# Patient Record
Sex: FEMALE | Race: WHITE | NOT HISPANIC OR LATINO | Employment: FULL TIME | URBAN - METROPOLITAN AREA
[De-identification: names, ages, dates, MRNs, and addresses within clinical notes are randomized per-mention and may not be internally consistent; named-entity substitution may affect disease eponyms.]

---

## 2020-09-14 ENCOUNTER — NURSE TRIAGE (OUTPATIENT)
Dept: OTHER | Facility: OTHER | Age: 60
End: 2020-09-14

## 2020-09-14 DIAGNOSIS — Z11.59 SPECIAL SCREENING EXAMINATION FOR VIRAL DISEASE: ICD-10-CM

## 2020-09-14 DIAGNOSIS — Z11.59 SPECIAL SCREENING EXAMINATION FOR VIRAL DISEASE: Primary | ICD-10-CM

## 2020-09-14 PROCEDURE — U0003 INFECTIOUS AGENT DETECTION BY NUCLEIC ACID (DNA OR RNA); SEVERE ACUTE RESPIRATORY SYNDROME CORONAVIRUS 2 (SARS-COV-2) (CORONAVIRUS DISEASE [COVID-19]), AMPLIFIED PROBE TECHNIQUE, MAKING USE OF HIGH THROUGHPUT TECHNOLOGIES AS DESCRIBED BY CMS-2020-01-R: HCPCS | Performed by: FAMILY MEDICINE

## 2020-09-14 NOTE — TELEPHONE ENCOUNTER
Regarding: ETINP-12 test required by [facility]  ----- Message from George Augustin RN sent at 9/14/2020  9:41 AM EDT -----  Patient is a [employer] and requires [phone] testing

## 2020-09-15 LAB — SARS-COV-2 RNA SPEC QL NAA+PROBE: NOT DETECTED

## 2021-01-05 ENCOUNTER — IMMUNIZATIONS (OUTPATIENT)
Dept: FAMILY MEDICINE CLINIC | Facility: HOSPITAL | Age: 61
End: 2021-01-05

## 2021-01-05 DIAGNOSIS — Z23 ENCOUNTER FOR IMMUNIZATION: ICD-10-CM

## 2021-01-05 PROCEDURE — 0011A SARS-COV-2 / COVID-19 MRNA VACCINE (MODERNA) 100 MCG: CPT

## 2021-01-05 PROCEDURE — 91301 SARS-COV-2 / COVID-19 MRNA VACCINE (MODERNA) 100 MCG: CPT

## 2021-02-05 ENCOUNTER — IMMUNIZATIONS (OUTPATIENT)
Dept: FAMILY MEDICINE CLINIC | Facility: HOSPITAL | Age: 61
End: 2021-02-05

## 2021-02-05 DIAGNOSIS — Z23 ENCOUNTER FOR IMMUNIZATION: Primary | ICD-10-CM

## 2021-02-05 PROCEDURE — 0012A SARS-COV-2 / COVID-19 MRNA VACCINE (MODERNA) 100 MCG: CPT

## 2021-02-05 PROCEDURE — 91301 SARS-COV-2 / COVID-19 MRNA VACCINE (MODERNA) 100 MCG: CPT

## 2021-06-21 LAB
LEFT EYE DIABETIC RETINOPATHY: NORMAL
RIGHT EYE DIABETIC RETINOPATHY: NORMAL

## 2021-08-26 ENCOUNTER — APPOINTMENT (OUTPATIENT)
Dept: URGENT CARE | Facility: CLINIC | Age: 61
End: 2021-08-26

## 2021-08-26 DIAGNOSIS — Z13.9 ENCOUNTER FOR HEALTH-RELATED SCREENING: Primary | ICD-10-CM

## 2021-08-26 PROCEDURE — 86787 VARICELLA-ZOSTER ANTIBODY: CPT

## 2021-08-26 PROCEDURE — 86765 RUBEOLA ANTIBODY: CPT

## 2021-08-26 PROCEDURE — 86735 MUMPS ANTIBODY: CPT

## 2021-08-26 PROCEDURE — 86480 TB TEST CELL IMMUN MEASURE: CPT

## 2021-08-26 PROCEDURE — 86762 RUBELLA ANTIBODY: CPT

## 2021-08-27 LAB — RUBV IGG SERPL IA-ACNC: >175 IU/ML

## 2021-08-30 LAB
GAMMA INTERFERON BACKGROUND BLD IA-ACNC: 0.03 IU/ML
M TB IFN-G BLD-IMP: NEGATIVE
M TB IFN-G CD4+ BCKGRND COR BLD-ACNC: 0.01 IU/ML
M TB IFN-G CD4+ BCKGRND COR BLD-ACNC: 0.02 IU/ML
MEV IGG SER QL: ABNORMAL
MITOGEN IGNF BCKGRD COR BLD-ACNC: >10 IU/ML
MUV IGG SER QL: ABNORMAL
VZV IGG SER IA-ACNC: NORMAL

## 2021-10-28 ENCOUNTER — OFFICE VISIT (OUTPATIENT)
Dept: URGENT CARE | Facility: CLINIC | Age: 61
End: 2021-10-28
Payer: COMMERCIAL

## 2021-10-28 VITALS
HEIGHT: 68 IN | SYSTOLIC BLOOD PRESSURE: 118 MMHG | WEIGHT: 157.4 LBS | BODY MASS INDEX: 23.86 KG/M2 | TEMPERATURE: 97.2 F | DIASTOLIC BLOOD PRESSURE: 68 MMHG | OXYGEN SATURATION: 98 % | HEART RATE: 85 BPM | RESPIRATION RATE: 16 BRPM

## 2021-10-28 DIAGNOSIS — R31.9 URINARY TRACT INFECTION WITH HEMATURIA, SITE UNSPECIFIED: ICD-10-CM

## 2021-10-28 DIAGNOSIS — R35.0 FREQUENCY OF MICTURITION: Primary | ICD-10-CM

## 2021-10-28 DIAGNOSIS — N39.0 URINARY TRACT INFECTION WITH HEMATURIA, SITE UNSPECIFIED: ICD-10-CM

## 2021-10-28 LAB — GLUCOSE SERPL-MCNC: 116 MG/DL (ref 65–140)

## 2021-10-28 PROCEDURE — 87086 URINE CULTURE/COLONY COUNT: CPT | Performed by: PHYSICIAN ASSISTANT

## 2021-10-28 PROCEDURE — 87077 CULTURE AEROBIC IDENTIFY: CPT | Performed by: PHYSICIAN ASSISTANT

## 2021-10-28 PROCEDURE — G0382 LEV 3 HOSP TYPE B ED VISIT: HCPCS | Performed by: PHYSICIAN ASSISTANT

## 2021-10-28 PROCEDURE — 87186 SC STD MICRODIL/AGAR DIL: CPT | Performed by: PHYSICIAN ASSISTANT

## 2021-10-28 PROCEDURE — 82948 REAGENT STRIP/BLOOD GLUCOSE: CPT | Performed by: PHYSICIAN ASSISTANT

## 2021-10-28 RX ORDER — VENLAFAXINE HYDROCHLORIDE 150 MG/1
150 CAPSULE, EXTENDED RELEASE ORAL DAILY
COMMUNITY
Start: 2021-07-29 | End: 2022-07-08 | Stop reason: SDUPTHER

## 2021-10-28 RX ORDER — VENLAFAXINE HYDROCHLORIDE 75 MG/1
75 CAPSULE, EXTENDED RELEASE ORAL DAILY
COMMUNITY
Start: 2021-07-29 | End: 2022-07-08 | Stop reason: SDUPTHER

## 2021-10-28 RX ORDER — CEPHALEXIN 500 MG/1
500 CAPSULE ORAL EVERY 12 HOURS SCHEDULED
Qty: 14 CAPSULE | Refills: 0 | Status: SHIPPED | OUTPATIENT
Start: 2021-10-28 | End: 2021-11-04

## 2021-10-28 RX ORDER — DAPAGLIFLOZIN 10 MG/1
10 TABLET, FILM COATED ORAL DAILY
COMMUNITY
Start: 2021-07-29 | End: 2022-07-08 | Stop reason: SDUPTHER

## 2021-10-31 LAB — BACTERIA UR CULT: ABNORMAL

## 2021-11-02 ENCOUNTER — TELEPHONE (OUTPATIENT)
Dept: URGENT CARE | Facility: CLINIC | Age: 61
End: 2021-11-02

## 2021-11-04 ENCOUNTER — OFFICE VISIT (OUTPATIENT)
Dept: FAMILY MEDICINE CLINIC | Facility: CLINIC | Age: 61
End: 2021-11-04
Payer: COMMERCIAL

## 2021-11-04 VITALS
TEMPERATURE: 98.2 F | HEART RATE: 88 BPM | OXYGEN SATURATION: 99 % | BODY MASS INDEX: 24.4 KG/M2 | DIASTOLIC BLOOD PRESSURE: 78 MMHG | WEIGHT: 161 LBS | HEIGHT: 68 IN | SYSTOLIC BLOOD PRESSURE: 116 MMHG

## 2021-11-04 DIAGNOSIS — Z11.59 NEED FOR HEPATITIS C SCREENING TEST: ICD-10-CM

## 2021-11-04 DIAGNOSIS — Z23 ENCOUNTER FOR IMMUNIZATION: ICD-10-CM

## 2021-11-04 DIAGNOSIS — K58.2 IRRITABLE BOWEL SYNDROME WITH BOTH CONSTIPATION AND DIARRHEA: ICD-10-CM

## 2021-11-04 DIAGNOSIS — E11.9 TYPE 2 DIABETES MELLITUS WITHOUT COMPLICATION, WITHOUT LONG-TERM CURRENT USE OF INSULIN (HCC): ICD-10-CM

## 2021-11-04 DIAGNOSIS — Z00.00 ENCOUNTER FOR ANNUAL PHYSICAL EXAM: Primary | ICD-10-CM

## 2021-11-04 DIAGNOSIS — R63.4 WEIGHT LOSS: ICD-10-CM

## 2021-11-04 DIAGNOSIS — Z85.3 HISTORY OF BREAST CANCER: ICD-10-CM

## 2021-11-04 DIAGNOSIS — Z78.0 POST-MENOPAUSAL: ICD-10-CM

## 2021-11-04 DIAGNOSIS — F32.5 MAJOR DEPRESSIVE DISORDER WITH SINGLE EPISODE, IN FULL REMISSION (HCC): ICD-10-CM

## 2021-11-04 DIAGNOSIS — Z12.11 COLON CANCER SCREENING: ICD-10-CM

## 2021-11-04 DIAGNOSIS — F41.9 ANXIETY: ICD-10-CM

## 2021-11-04 PROBLEM — K58.9 IRRITABLE BOWEL SYNDROME (IBS): Status: ACTIVE | Noted: 2021-11-04

## 2021-11-04 PROBLEM — C50.919 BREAST CANCER (HCC): Status: ACTIVE | Noted: 2021-11-04

## 2021-11-04 PROCEDURE — 99386 PREV VISIT NEW AGE 40-64: CPT | Performed by: FAMILY MEDICINE

## 2021-11-04 PROCEDURE — 99214 OFFICE O/P EST MOD 30 MIN: CPT | Performed by: FAMILY MEDICINE

## 2021-11-04 RX ORDER — LISINOPRIL 20 MG/1
20 TABLET ORAL DAILY
COMMUNITY
Start: 2021-07-29 | End: 2022-05-19 | Stop reason: SDDI

## 2021-11-04 RX ORDER — BLOOD-GLUCOSE METER
EACH MISCELLANEOUS
COMMUNITY
Start: 2021-08-02

## 2021-11-04 RX ORDER — CLOTRIMAZOLE AND BETAMETHASONE DIPROPIONATE 10; .64 MG/G; MG/G
CREAM TOPICAL
COMMUNITY
Start: 2021-08-20 | End: 2022-07-08 | Stop reason: SDUPTHER

## 2021-11-04 RX ORDER — ATORVASTATIN CALCIUM 10 MG/1
10 TABLET, FILM COATED ORAL DAILY
COMMUNITY
Start: 2021-07-29

## 2021-11-04 RX ORDER — METRONIDAZOLE 7.5 MG/G
GEL VAGINAL
COMMUNITY
Start: 2021-08-02 | End: 2021-11-04

## 2021-11-04 RX ORDER — BLOOD SUGAR DIAGNOSTIC
STRIP MISCELLANEOUS
COMMUNITY
Start: 2021-08-02

## 2021-11-04 RX ORDER — LANCETS
EACH MISCELLANEOUS
COMMUNITY
Start: 2021-08-02

## 2021-11-06 ENCOUNTER — APPOINTMENT (OUTPATIENT)
Dept: LAB | Facility: HOSPITAL | Age: 61
End: 2021-11-06
Payer: COMMERCIAL

## 2021-11-06 DIAGNOSIS — R63.4 WEIGHT LOSS: ICD-10-CM

## 2021-11-06 DIAGNOSIS — Z11.59 NEED FOR HEPATITIS C SCREENING TEST: ICD-10-CM

## 2021-11-06 DIAGNOSIS — E11.9 TYPE 2 DIABETES MELLITUS WITHOUT COMPLICATION, WITHOUT LONG-TERM CURRENT USE OF INSULIN (HCC): ICD-10-CM

## 2021-11-06 LAB
ALBUMIN SERPL BCP-MCNC: 3.6 G/DL (ref 3.5–5)
ALP SERPL-CCNC: 69 U/L (ref 46–116)
ALT SERPL W P-5'-P-CCNC: 35 U/L (ref 12–78)
ANION GAP SERPL CALCULATED.3IONS-SCNC: 8 MMOL/L (ref 4–13)
AST SERPL W P-5'-P-CCNC: 22 U/L (ref 5–45)
BILIRUB SERPL-MCNC: 0.4 MG/DL (ref 0.2–1)
BUN SERPL-MCNC: 13 MG/DL (ref 5–25)
CALCIUM SERPL-MCNC: 9.4 MG/DL (ref 8.3–10.1)
CHLORIDE SERPL-SCNC: 104 MMOL/L (ref 100–108)
CHOLEST SERPL-MCNC: 178 MG/DL (ref 50–200)
CO2 SERPL-SCNC: 29 MMOL/L (ref 21–32)
CREAT SERPL-MCNC: 0.75 MG/DL (ref 0.6–1.3)
EST. AVERAGE GLUCOSE BLD GHB EST-MCNC: 140 MG/DL
GFR SERPL CREATININE-BSD FRML MDRD: 86 ML/MIN/1.73SQ M
GLUCOSE P FAST SERPL-MCNC: 180 MG/DL (ref 65–99)
HBA1C MFR BLD: 6.5 %
HCV AB SER QL: NORMAL
HDLC SERPL-MCNC: 47 MG/DL
LDLC SERPL CALC-MCNC: 103 MG/DL (ref 0–100)
NONHDLC SERPL-MCNC: 131 MG/DL
POTASSIUM SERPL-SCNC: 4.6 MMOL/L (ref 3.5–5.3)
PROT SERPL-MCNC: 7 G/DL (ref 6.4–8.2)
SODIUM SERPL-SCNC: 141 MMOL/L (ref 136–145)
TRIGL SERPL-MCNC: 140 MG/DL
TSH SERPL DL<=0.05 MIU/L-ACNC: 1.1 UIU/ML (ref 0.36–3.74)

## 2021-11-06 PROCEDURE — 86803 HEPATITIS C AB TEST: CPT

## 2021-11-06 PROCEDURE — 83036 HEMOGLOBIN GLYCOSYLATED A1C: CPT

## 2021-11-06 PROCEDURE — 36415 COLL VENOUS BLD VENIPUNCTURE: CPT

## 2021-11-06 PROCEDURE — 80061 LIPID PANEL: CPT

## 2021-11-06 PROCEDURE — 80053 COMPREHEN METABOLIC PANEL: CPT

## 2021-11-06 PROCEDURE — 84443 ASSAY THYROID STIM HORMONE: CPT

## 2021-11-09 ENCOUNTER — TELEPHONE (OUTPATIENT)
Dept: ADMINISTRATIVE | Facility: OTHER | Age: 61
End: 2021-11-09

## 2021-11-18 ENCOUNTER — TELEPHONE (OUTPATIENT)
Dept: DIABETES SERVICES | Facility: CLINIC | Age: 61
End: 2021-11-18

## 2021-11-19 ENCOUNTER — TELEPHONE (OUTPATIENT)
Dept: DIABETES SERVICES | Facility: CLINIC | Age: 61
End: 2021-11-19

## 2021-11-24 ENCOUNTER — TELEPHONE (OUTPATIENT)
Dept: DIABETES SERVICES | Facility: CLINIC | Age: 61
End: 2021-11-24

## 2022-02-04 PROBLEM — Z90.13 HX OF BILATERAL MASTECTOMY: Status: ACTIVE | Noted: 2022-02-04

## 2022-02-08 ENCOUNTER — TELEPHONE (OUTPATIENT)
Dept: FAMILY MEDICINE CLINIC | Facility: CLINIC | Age: 62
End: 2022-02-08

## 2022-02-08 NOTE — TELEPHONE ENCOUNTER
----- Message from Vannesa Vazquez, DO sent at 2/8/2022  1:52 PM EST -----  Does not look like we received results or had her sign record release for records from Dr Phylliss Halsted at [facility]? Can you tell if we had her sign release  If we did, refax to previous PCP to obtain records  Thanks! Presents for INR for Deep Vein Thrombosis. Therapeutic. Confirmed current dose of warfarin.  Verbal and written instructions given. Restates correctly. Recheck INR in 4 weeks.  The provider for this visit was Karey Foote NP.    Patient declined AVS

## 2022-02-08 NOTE — TELEPHONE ENCOUNTER
Sent mychart to update number, will consult about record release -none in chart, prob didn't sign on when she was here

## 2022-02-09 ENCOUNTER — TELEPHONE (OUTPATIENT)
Dept: ADMINISTRATIVE | Facility: OTHER | Age: 62
End: 2022-02-09

## 2022-02-09 NOTE — LETTER
Procedure Request Form: B/L Mastectomy Report      Date Requested: 02/10/22  Patient: Marco Alcazar  Patient : 1960   Referring Provider: Jacinda Joseph, DO        Date of Procedure ______________________________       The above patient has informed us that they have completed their   most recent  B/L Mastectomy Report at your facility  Please complete   this form and attach all corresponding procedure reports/results  Comments __________________________________________________________  ____________________________________________________________________  ____________________________________________________________________  ____________________________________________________________________    Facility Completing Procedure _________________________________________    Form Completed By (print name) _______________________________________      Signature __________________________________________________________      These reports are needed for  compliance  Please fax this completed form and a copy of the procedure report to our office located at [place] [address] as soon as possible to [phone] ruddy Naqvi: Phone [phone]    We thank you for your assistance in treating our mutual patient

## 2022-02-09 NOTE — TELEPHONE ENCOUNTER
----- Message from Edin Long DO sent at 2/8/2022  1:53 PM EST -----  Regarding: care gap request  11/18/20 3:56 PM    Hello, our patient attached above has had bilateral mastectomy completed/performed  Please assist in obtaining the exclusion documentation by making an External outreach to Indio Soares at [facility] (her previous PCP) facility located in \[facility]\""  The date of service is ? ?      Thank you,  Edin Long DO  [facility]

## 2022-02-09 NOTE — LETTER
Procedure Request Form:  B/L Mastectomy OP Report      Date Requested: 22  Patient: Nieves Drought  Patient : 1960   Referring Provider: Diana Sat, DO        Date of Procedure ______________________________       The above patient has informed us that they have completed their   [phone] B/L Mastectomy OP Report  at your facility  Please complete   this form and attach all corresponding procedure reports/results  Comments __________________________________________________________  ____________________________________________________________________  ____________________________________________________________________  ____________________________________________________________________    Facility Completing Procedure _________________________________________    Form Completed By (print name) _______________________________________      Signature __________________________________________________________      These reports are needed for  compliance  Please fax this completed form and a copy of the procedure report to our office located at [place] [address] as soon as possible to [phone] ruddy Parham: Phone [phone]    We thank you for your assistance in treating our mutual patient

## 2022-02-10 NOTE — TELEPHONE ENCOUNTER
Upon review of the In Basket request and the patient's chart, initial outreach has been made via telephone call and fax, please see Contacts section for details       Thank you  Dontae Ruiz

## 2022-02-16 NOTE — TELEPHONE ENCOUNTER
As a follow-up, a second attempt has been made for outreach via telephone call, please see Contacts section for details  & Fax 2008 B/L Mastectomy Report from [facility]  Letter sent  No Medical Release letter in chart      Thank you  Anne Calderón

## 2022-02-16 NOTE — TELEPHONE ENCOUNTER
Upon review of the In Basket request we Patient must have a Medical Release form sent to fax number [phone] in order for them to release the operation report  Any additional questions or concerns should be emailed to the Practice Liaisons via [email or web address] email, please do not reply via In Basket      Thank you  Anne Calderón

## 2022-04-28 ENCOUNTER — TELEPHONE (OUTPATIENT)
Dept: FAMILY MEDICINE CLINIC | Facility: CLINIC | Age: 62
End: 2022-04-28

## 2022-04-28 NOTE — TELEPHONE ENCOUNTER
Please call patient to find out whether she had her cologuard done  I had ordered this at her visit in November but have not received results so wanted to be sure that I didn't miss it  Also, patient due for 6 month f/u on her depression, diabetes  Nothing scheduled for may yet  Can you schedule her?

## 2022-04-29 NOTE — TELEPHONE ENCOUNTER
Left VM asking pt to call office to discuss a question from Dr Josette Gomez and to schedule her f/u appmt

## 2022-05-19 ENCOUNTER — OFFICE VISIT (OUTPATIENT)
Dept: FAMILY MEDICINE CLINIC | Facility: CLINIC | Age: 62
End: 2022-05-19
Payer: COMMERCIAL

## 2022-05-19 VITALS
TEMPERATURE: 99.1 F | OXYGEN SATURATION: 98 % | DIASTOLIC BLOOD PRESSURE: 88 MMHG | WEIGHT: 168 LBS | HEIGHT: 68 IN | BODY MASS INDEX: 25.46 KG/M2 | HEART RATE: 96 BPM | SYSTOLIC BLOOD PRESSURE: 124 MMHG

## 2022-05-19 DIAGNOSIS — R07.2 SUBSTERNAL CHEST PAIN: ICD-10-CM

## 2022-05-19 DIAGNOSIS — Z12.11 COLON CANCER SCREENING: ICD-10-CM

## 2022-05-19 DIAGNOSIS — E11.9 TYPE 2 DIABETES MELLITUS WITHOUT COMPLICATION, WITHOUT LONG-TERM CURRENT USE OF INSULIN (HCC): Primary | ICD-10-CM

## 2022-05-19 DIAGNOSIS — R53.83 FATIGUE, UNSPECIFIED TYPE: ICD-10-CM

## 2022-05-19 PROCEDURE — 93000 ELECTROCARDIOGRAM COMPLETE: CPT | Performed by: FAMILY MEDICINE

## 2022-05-19 PROCEDURE — 99215 OFFICE O/P EST HI 40 MIN: CPT | Performed by: FAMILY MEDICINE

## 2022-05-19 RX ORDER — LISINOPRIL 10 MG/1
10 TABLET ORAL DAILY
Qty: 90 TABLET | Refills: 1 | Status: SHIPPED | OUTPATIENT
Start: 2022-05-19

## 2022-05-19 RX ORDER — BLOOD-GLUCOSE,RECEIVER,CONT
1 EACH MISCELLANEOUS DAILY
Qty: 1 EACH | Refills: 0 | Status: SHIPPED | OUTPATIENT
Start: 2022-05-19

## 2022-05-19 RX ORDER — FLASH GLUCOSE SCANNING READER
1 EACH MISCELLANEOUS DAILY
Qty: 1 EACH | Refills: 1 | Status: SHIPPED | OUTPATIENT
Start: 2022-05-19 | End: 2022-05-19

## 2022-05-19 RX ORDER — BLOOD-GLUCOSE SENSOR
1 EACH MISCELLANEOUS DAILY
Qty: 3 EACH | Refills: 1 | Status: SHIPPED | OUTPATIENT
Start: 2022-05-19 | End: 2022-07-08 | Stop reason: SDUPTHER

## 2022-05-19 RX ORDER — FLASH GLUCOSE SENSOR
1 KIT MISCELLANEOUS DAILY
Qty: 1 EACH | Refills: 1 | Status: SHIPPED | OUTPATIENT
Start: 2022-05-19 | End: 2022-05-19

## 2022-05-19 NOTE — PATIENT INSTRUCTIONS
Please get labs done fasting as ordered  Resume taking your lisinopril  I sent a prescription to your pharmacy for a lower (10mg ) dose

## 2022-05-19 NOTE — PROGRESS NOTES
Assessment/Plan:    No problem-specific Assessment & Plan notes found for this encounter  Diagnoses and all orders for this visit:    Type 2 diabetes mellitus without complication, without long-term current use of insulin (HCC)  -     Hemoglobin A1C; Future  -     Comprehensive metabolic panel; Future  -     Microalbumin / creatinine urine ratio; Future  -     Lipid Panel with Direct LDL reflex; Future  -     lisinopril (ZESTRIL) 10 mg tablet; Take 1 tablet (10 mg total) by mouth in the morning  Lab Results   Component Value Date    HGBA1C 6 5 (H) 11/06/2021     Patient is due for repeat labs  Ordered today  She was encouraged to resume lisinopril  Will prescribe at lower dose for renal protection  Ordered dexcom per patient request    Fatigue, unspecified type  -     CBC and differential; Future  Check cbc  Colon cancer screening  -     Cologuard  She never received the cologuard box from [facility] when I placed order last fall  I re-ordered and asked her to call me if she does not receive box in mail  Substernal chest pain  -     POCT ECG  ekg in office today  Normal sinus rhythm  Normal axis  No acute changes  She is diabetic so am ordering nuclear stress test to rule out ischemia          Subjective:      Patient ID: Cora Morelos is a 59 y o  female with type 2 diabetes, depression, anxiety and history of breast cancer here today for complaint of chest pain  Pain is substernal in location and has been going on intermittently for the last  2 days  Pain is described as a pressure  States that when pain occurs,  it feels as if she has to belch  She has not belched nor does she notice any reflux at all  Her pain does not radiate  There are no aggravating or alleviating factors  No associated diaphoresis, shortness of breath, nausea, or lightheadedness  No cough  Does admit to some fatigue and body-aches today  No fever or chills   States that she occasionally will feel achey in general ever since she was diagnosed breast cancer  Patient was given order for cologuard at visit in November  She has not completed yet  States that she never received the box from [facility]? Had cervical cancer screen done last year through previous PCP office  We have not received her records yet  Will forward request for pap report to our Care Gap team to reach out for this report in order to update her chart  Due for prevnar 20  She is not taking her lisinopril as prescribed  States that previous pcp gave this to her for her diabetes  She did not experience any side effects while taking, but did not think she needed it  Patient is requesting rx for continuous glucose monitor  She is currently not checking sugars because she does not like to do fingersticks  HPI    The following portions of the patient's history were reviewed and updated as appropriate:   She  has a past medical history of Anxiety, Breast cancer ([facility] ) (2008), Diabetes mellitus ([facility] ), IBS (irritable bowel syndrome), and Major depressive [facility] with single episode[facility]  She  has a past surgical history that includes Complete mastectomy w/ sentinel node biopsy (2008)  She  reports that she quit smoking about 23 years ago  Her smoking use included cigarettes  She smoked 0 50 packs per day  She has never used smokeless tobacco  She reports current alcohol use  She reports that she does not use drugs    Current Outpatient Medications on File Prior to Visit   Medication Sig    atorvastatin (LIPITOR) 10 mg tablet Take 10 mg by mouth daily     Blood Glucose Monitoring Suppl (Contour Next EZ) w/Device KIT     clotrimazole-betamethasone (LOTRISONE) 1-0 05 % cream apply to affected area once daily if needed ITCHING FOR 7 DAYS    Contour Next Test test strip     Farxiga 10 MG TABS Take 10 mg by mouth daily     metFORMIN (GLUCOPHAGE) 500 mg tablet Take 1,000 mg by mouth 2 (two) times a day with meals     Microlet Lancets MISC     venlafaxine (EFFEXOR-XR) 150 mg 24 hr capsule Take 150 mg by mouth daily     venlafaxine (EFFEXOR-XR) 75 mg 24 hr capsule Take 75 mg by mouth daily     lisinopril (ZESTRIL) 20 mg tablet Take 20 mg by mouth daily  (Patient not taking: Reported on 5/19/2022)     No current facility-administered medications on file prior to visit  She is allergic to nuts - food allergy and sulfa antibiotics       Review of Systems      Objective:      /88 (BP Location: Left arm, Patient Position: Sitting, Cuff Size: Large)   Pulse 96   Temp 99 1 °F (37 3 °C) (Tympanic)   Ht 5' 8" (1 727 m)   Wt 76 2 kg (168 lb)   SpO2 98%   BMI 25 54 kg/m²          Physical Exam  Vitals and nursing note reviewed  Constitutional:       General: She is not in acute distress  Appearance: She is well-developed  She is not ill-appearing or diaphoretic  HENT:      Head: Normocephalic and atraumatic  Neck:      Comments: No thyromegaly  Cardiovascular:      Rate and Rhythm: Normal rate and regular rhythm  Heart sounds: No murmur heard  No friction rub  No gallop  Pulmonary:      Effort: Pulmonary effort is normal  No respiratory distress  Breath sounds: Normal breath sounds  No stridor  No wheezing  Abdominal:      General: Bowel sounds are normal  There is no distension  Palpations: Abdomen is soft  There is no mass  Tenderness: There is no abdominal tenderness  There is no guarding  Musculoskeletal:      Cervical back: Normal range of motion and neck supple  Right lower leg: No edema  Left lower leg: No edema  Comments: No tenderness on palpation of anterior chest wall/sternum   Lymphadenopathy:      Cervical: No cervical adenopathy  Skin:     General: Skin is warm and dry  Findings: No rash  Neurological:      General: No focal deficit present  Mental Status: She is alert and oriented to person, place, and time     Psychiatric: Mood and Affect: Mood normal

## 2022-05-20 ENCOUNTER — TELEPHONE (OUTPATIENT)
Dept: ADMINISTRATIVE | Facility: OTHER | Age: 62
End: 2022-05-20

## 2022-05-20 NOTE — LETTER
Procedure Request Form: Cervical Cancer Screening      Date Requested: 22  Patient: Fracisco Chavezast  Patient : 1960   Referring Provider: Tyler Loser, DO        Date of Procedure ______________________________       The above patient has informed us that they have completed their   most recent Cervical Cancer Screening at your facility  Please complete   this form and attach all corresponding procedure reports/results  Comments __________________________________________________________  ____________________________________________________________________  ____________________________________________________________________  ____________________________________________________________________    Facility Completing Procedure _________________________________________    Form Completed By (print name) _______________________________________      Signature __________________________________________________________      These reports are needed for  compliance  Please fax this completed form and a copy of the procedure report to our office located at [place] [address] as soon as possible to [phone] ruddy Villagran: Phone [phone]    We thank you for your assistance in treating our mutual patient

## 2022-05-20 NOTE — LETTER
Procedure Request Form: Cervical Cancer Screening      Date Requested: 22  Patient: Kadeem Casillas  Patient : 1960   Referring Provider: Delvis Verde, DO        Date of Procedure ______________________________       The above patient has informed us that they have completed their   most recent Cervical Cancer Screening at your facility  Please complete   this form and attach all corresponding procedure reports/results  Comments __________________________________________________________  ____________________________________________________________________  ____________________________________________________________________  ____________________________________________________________________    Facility Completing Procedure _________________________________________    Form Completed By (print name) _______________________________________      Signature __________________________________________________________      These reports are needed for  compliance  Please fax this completed form and a copy of the procedure report to our office located at [place] [address] as soon as possible to [phone] ruddy Olivera Simple: Phone [phone]    We thank you for your assistance in treating our mutual patient

## 2022-05-20 NOTE — TELEPHONE ENCOUNTER
----- Message from Ivone Jaimes DO sent at 5/19/2022  3:46 PM EDT -----  Regarding: care gap request  11/18/20 3:57 PM    Hello, our patient attached above has had Pap Smear (HPV) aka Cervical Cancer Screening completed/performed  Please assist in updating the patient chart by making an External outreach to Abundio Villeda MD with [facility]--facility located in [facility]  The date of service is 2021      Thank you,  Ivone Jaimes DO  [facility]

## 2022-05-20 NOTE — TELEPHONE ENCOUNTER
Upon review of the In Basket request and the patient's chart, initial outreach has been made via fax, please see Contacts section for details       Thank you  Garret Briones

## 2022-05-25 NOTE — TELEPHONE ENCOUNTER
As a follow-up, a second attempt has been made for outreach via fax, please see Contacts section for details      Thank you  Kyle Perez

## 2022-05-27 NOTE — TELEPHONE ENCOUNTER
As a final attempt, a third outreach has been made via telephone call  Please see Contacts section for details  This encounter will be closed and completed by end of day  Should we receive the requested information because of previous outreach attempts, the requested patient's chart will be updated appropriately       Thank you  Elise Hidalgo

## 2022-06-04 ENCOUNTER — APPOINTMENT (OUTPATIENT)
Dept: LAB | Facility: HOSPITAL | Age: 62
End: 2022-06-04
Payer: COMMERCIAL

## 2022-06-04 DIAGNOSIS — E11.9 TYPE 2 DIABETES MELLITUS WITHOUT COMPLICATION, WITHOUT LONG-TERM CURRENT USE OF INSULIN (HCC): ICD-10-CM

## 2022-06-04 DIAGNOSIS — R53.83 FATIGUE, UNSPECIFIED TYPE: ICD-10-CM

## 2022-06-04 LAB
ALBUMIN SERPL BCP-MCNC: 3.8 G/DL (ref 3.5–5)
ALP SERPL-CCNC: 132 U/L (ref 46–116)
ALT SERPL W P-5'-P-CCNC: 31 U/L (ref 12–78)
ANION GAP SERPL CALCULATED.3IONS-SCNC: 12 MMOL/L (ref 4–13)
AST SERPL W P-5'-P-CCNC: 17 U/L (ref 5–45)
BASOPHILS # BLD AUTO: 0.06 THOUSANDS/ΜL (ref 0–0.1)
BASOPHILS NFR BLD AUTO: 1 % (ref 0–1)
BILIRUB SERPL-MCNC: 0.4 MG/DL (ref 0.2–1)
BUN SERPL-MCNC: 11 MG/DL (ref 5–25)
CALCIUM SERPL-MCNC: 8.9 MG/DL (ref 8.3–10.1)
CHLORIDE SERPL-SCNC: 103 MMOL/L (ref 100–108)
CHOLEST SERPL-MCNC: 190 MG/DL
CO2 SERPL-SCNC: 24 MMOL/L (ref 21–32)
CREAT SERPL-MCNC: 1.23 MG/DL (ref 0.6–1.3)
EOSINOPHIL # BLD AUTO: 0.12 THOUSAND/ΜL (ref 0–0.61)
EOSINOPHIL NFR BLD AUTO: 1 % (ref 0–6)
ERYTHROCYTE [DISTWIDTH] IN BLOOD BY AUTOMATED COUNT: 12.5 % (ref 11.6–15.1)
EST. AVERAGE GLUCOSE BLD GHB EST-MCNC: 189 MG/DL
GFR SERPL CREATININE-BSD FRML MDRD: 46 ML/MIN/1.73SQ M
GLUCOSE P FAST SERPL-MCNC: 408 MG/DL (ref 65–99)
HBA1C MFR BLD: 8.2 %
HCT VFR BLD AUTO: 44.2 % (ref 34.8–46.1)
HDLC SERPL-MCNC: 42 MG/DL
HGB BLD-MCNC: 15.1 G/DL (ref 11.5–15.4)
IMM GRANULOCYTES # BLD AUTO: 0.04 THOUSAND/UL (ref 0–0.2)
IMM GRANULOCYTES NFR BLD AUTO: 1 % (ref 0–2)
LDLC SERPL CALC-MCNC: 106 MG/DL (ref 0–100)
LYMPHOCYTES # BLD AUTO: 2.06 THOUSANDS/ΜL (ref 0.6–4.47)
LYMPHOCYTES NFR BLD AUTO: 24 % (ref 14–44)
MCH RBC QN AUTO: 29.5 PG (ref 26.8–34.3)
MCHC RBC AUTO-ENTMCNC: 34.2 G/DL (ref 31.4–37.4)
MCV RBC AUTO: 87 FL (ref 82–98)
MONOCYTES # BLD AUTO: 0.53 THOUSAND/ΜL (ref 0.17–1.22)
MONOCYTES NFR BLD AUTO: 6 % (ref 4–12)
NEUTROPHILS # BLD AUTO: 5.78 THOUSANDS/ΜL (ref 1.85–7.62)
NEUTS SEG NFR BLD AUTO: 67 % (ref 43–75)
NRBC BLD AUTO-RTO: 0 /100 WBCS
PLATELET # BLD AUTO: 362 THOUSANDS/UL (ref 149–390)
PMV BLD AUTO: 9.1 FL (ref 8.9–12.7)
POTASSIUM SERPL-SCNC: 3.8 MMOL/L (ref 3.5–5.3)
PROT SERPL-MCNC: 7.4 G/DL (ref 6.4–8.2)
RBC # BLD AUTO: 5.11 MILLION/UL (ref 3.81–5.12)
SODIUM SERPL-SCNC: 139 MMOL/L (ref 136–145)
TRIGL SERPL-MCNC: 212 MG/DL
WBC # BLD AUTO: 8.59 THOUSAND/UL (ref 4.31–10.16)

## 2022-06-04 PROCEDURE — 80053 COMPREHEN METABOLIC PANEL: CPT

## 2022-06-04 PROCEDURE — 83036 HEMOGLOBIN GLYCOSYLATED A1C: CPT

## 2022-06-04 PROCEDURE — 36415 COLL VENOUS BLD VENIPUNCTURE: CPT

## 2022-06-04 PROCEDURE — 85025 COMPLETE CBC W/AUTO DIFF WBC: CPT

## 2022-06-04 PROCEDURE — 80061 LIPID PANEL: CPT

## 2022-06-06 ENCOUNTER — TELEPHONE (OUTPATIENT)
Dept: FAMILY MEDICINE CLINIC | Facility: CLINIC | Age: 62
End: 2022-06-06

## 2022-06-06 NOTE — TELEPHONE ENCOUNTER
Pt calls in stating that the dexacom was denied by [facility] was contacted for verification  States that they are unsure of why it was denied and a prior Gilles Nick will be faxed to [phone]  they state that a prior auth was sent on 06/01/2022 to [id number] in chart  Spoke to Angela Solis the pharmacist and Kaylyn(tech)  Any other questions we are to contact [facility] mail order [phone]

## 2022-06-07 ENCOUNTER — TELEPHONE (OUTPATIENT)
Dept: FAMILY MEDICINE CLINIC | Facility: CLINIC | Age: 62
End: 2022-06-07

## 2022-06-07 NOTE — TELEPHONE ENCOUNTER
Upon review of the In Basket request we were able to locate, review, and update the patient chart as requested for Pap Smear (HPV) aka Cervical Cancer Screening  Any additional questions or concerns should be emailed to the Practice Liaisons via [email or web address] email, please do not reply via In Basket      Thank you  Ayla Frazier

## 2022-06-07 NOTE — TELEPHONE ENCOUNTER
I called three times and did not get through to the staff  I left a VM requesting a call back to confirm any details missing for pt's Dexcom order  Also requested that [facility] resend the PA request via fax, as it was never received neither via direct or main fax  Summary:  Order's prior authorization has failed, as per patient  Awaiting PA request and further details to continue work on the process

## 2022-06-08 NOTE — TELEPHONE ENCOUNTER
I left another VM with \[facility]\"" pharmacy, requesting a call back to discuss the Dexcom rx and request that the Prior Authorization request be sent to our offices      PA has never been received, neither via main nor direct facsimile transmittal     Awaiting a call back to work toward satisfaction of this rx request

## 2022-06-08 NOTE — TELEPHONE ENCOUNTER
Hilda called to confirm that the VM were received  Sandhyar stated that the patient has removed her rx request, and stated that it is likely that she paid cash for it elsewhere

## 2022-06-08 NOTE — TELEPHONE ENCOUNTER
I left a VM, requesting the pt to call our office with any questions or requests  Our office is aware that her Dexcom was denied and that she chose to remove her rx request from [address] [place] [address]  Our office would like to know if there is anything that we can do for her

## 2022-06-16 ENCOUNTER — TELEPHONE (OUTPATIENT)
Dept: FAMILY MEDICINE CLINIC | Facility: CLINIC | Age: 62
End: 2022-06-16

## 2022-06-16 NOTE — TELEPHONE ENCOUNTER
Patient states she has not received cologuard yet  Contacted [facility] -spoke with Yeimy-states that there is an active order with last PCP and until that order is closed Dr Brisa Bai orders are on hold-patient will be informed to contact [facility]-patient aware and provided number [phone]  Will follow up with [facility] once I hear from patient that order has been chosen for Dr Adair Chappell

## 2022-06-16 NOTE — TELEPHONE ENCOUNTER
Please call patient to find out if she received her cologuard box from [facility]  I had sent original order back in November but she never received  Re-ordered at her visit in May  Wondering if she got it yet? If not we need to reach to to someone with respect to why they are not being sent?

## 2022-06-21 LAB
LEFT EYE DIABETIC RETINOPATHY: NORMAL
RIGHT EYE DIABETIC RETINOPATHY: NORMAL

## 2022-07-08 ENCOUNTER — OFFICE VISIT (OUTPATIENT)
Dept: FAMILY MEDICINE CLINIC | Facility: CLINIC | Age: 62
End: 2022-07-08
Payer: COMMERCIAL

## 2022-07-08 VITALS
HEART RATE: 102 BPM | WEIGHT: 165.5 LBS | OXYGEN SATURATION: 99 % | BODY MASS INDEX: 25.08 KG/M2 | DIASTOLIC BLOOD PRESSURE: 76 MMHG | SYSTOLIC BLOOD PRESSURE: 120 MMHG | HEIGHT: 68 IN | TEMPERATURE: 98.3 F

## 2022-07-08 DIAGNOSIS — F41.9 ANXIETY: ICD-10-CM

## 2022-07-08 DIAGNOSIS — E11.9 TYPE 2 DIABETES MELLITUS WITHOUT COMPLICATION, WITHOUT LONG-TERM CURRENT USE OF INSULIN (HCC): Primary | ICD-10-CM

## 2022-07-08 DIAGNOSIS — N28.9 RENAL INSUFFICIENCY: ICD-10-CM

## 2022-07-08 DIAGNOSIS — F32.5 MAJOR DEPRESSIVE DISORDER WITH SINGLE EPISODE, IN FULL REMISSION (HCC): ICD-10-CM

## 2022-07-08 DIAGNOSIS — Z23 NEED FOR VACCINATION: ICD-10-CM

## 2022-07-08 PROCEDURE — 99214 OFFICE O/P EST MOD 30 MIN: CPT | Performed by: FAMILY MEDICINE

## 2022-07-08 PROCEDURE — 90715 TDAP VACCINE 7 YRS/> IM: CPT

## 2022-07-08 PROCEDURE — 90471 IMMUNIZATION ADMIN: CPT

## 2022-07-08 RX ORDER — CLOTRIMAZOLE AND BETAMETHASONE DIPROPIONATE 10; .64 MG/G; MG/G
CREAM TOPICAL 2 TIMES DAILY
Qty: 30 G | Refills: 1 | Status: SHIPPED | OUTPATIENT
Start: 2022-07-08

## 2022-07-08 RX ORDER — BLOOD-GLUCOSE TRANSMITTER
EACH MISCELLANEOUS
COMMUNITY
Start: 2022-06-09 | End: 2022-09-09 | Stop reason: SDUPTHER

## 2022-07-08 RX ORDER — VENLAFAXINE HYDROCHLORIDE 75 MG/1
75 CAPSULE, EXTENDED RELEASE ORAL DAILY
Qty: 90 CAPSULE | Refills: 1 | Status: SHIPPED | OUTPATIENT
Start: 2022-07-08

## 2022-07-08 RX ORDER — DAPAGLIFLOZIN 10 MG/1
10 TABLET, FILM COATED ORAL DAILY
Qty: 90 TABLET | Refills: 1 | Status: SHIPPED | OUTPATIENT
Start: 2022-07-08

## 2022-07-08 RX ORDER — BLOOD-GLUCOSE TRANSMITTER
EACH MISCELLANEOUS
Status: CANCELLED | OUTPATIENT
Start: 2022-07-08

## 2022-07-08 RX ORDER — VENLAFAXINE HYDROCHLORIDE 150 MG/1
150 CAPSULE, EXTENDED RELEASE ORAL DAILY
Qty: 90 CAPSULE | Refills: 1 | Status: SHIPPED | OUTPATIENT
Start: 2022-07-08

## 2022-07-08 RX ORDER — BLOOD-GLUCOSE SENSOR
1 EACH MISCELLANEOUS DAILY
Qty: 9 EACH | Refills: 1 | Status: SHIPPED | OUTPATIENT
Start: 2022-07-08

## 2022-07-08 RX ORDER — BLOOD-GLUCOSE SENSOR
1 EACH MISCELLANEOUS DAILY
Qty: 3 EACH | Refills: 1 | Status: SHIPPED | OUTPATIENT
Start: 2022-07-08 | End: 2022-07-08 | Stop reason: SDUPTHER

## 2022-07-08 RX ORDER — INSULIN GLARGINE 100 [IU]/ML
10 INJECTION, SOLUTION SUBCUTANEOUS
Qty: 10 ML | Refills: 1 | Status: SHIPPED | OUTPATIENT
Start: 2022-07-08 | End: 2022-07-11

## 2022-07-08 NOTE — PROGRESS NOTES
Assessment/Plan:    No problem-specific Assessment & Plan notes found for this encounter  Diagnoses and all orders for this visit:    Type 2 diabetes mellitus without complication, without long-term current use of insulin (HCC)  -     Farxiga 10 MG TABS; Take 1 tablet (10 mg total) by mouth daily  -     clotrimazole-betamethasone (LOTRISONE) 1-0 05 % cream; Apply topically 2 (two) times a day  -     Discontinue: Continuous Blood Gluc Sensor (Dexcom G6 Sensor) MISC; Use 1 Units in the morning  -     insulin glargine (Lantus) 100 units/mL subcutaneous injection; Inject 10 Units under the skin daily at bedtime  -     Comprehensive metabolic panel; Future  -     Hemoglobin A1C; Future  -     Microalbumin / creatinine urine ratio; Future  -     Lipid panel; Future  -     Continuous Blood Gluc Sensor (Dexcom G6 Sensor) MISC; Use 1 Units in the morning  Reviewed patient's Dexcom logs  Reviewed recent lab results  Her A1c has gone up from 6 5 in November of 2021 to 8 2 on labs done 6/4/22  Will have her continue the metformin and farxiga at current doses  Add lantus 10 units at hs may titrate up by 2 units every 3 days for fasting sugars greater than 130  Will message me with readings in 2 weeks  Renal insufficiency  A bump in her creatinine over last 6 months (from 0 75 in November to 1 23 on 6/4/22)  ? Acute kidney injury vs CKD  Will stay on her farxiga as this has been proven to delay progression in patients with ckd  Would like to repeat her labs in October rather than waiting until December  Need for vaccination  -     TDAP VACCINE GREATER THAN OR EQUAL TO 8YO IM    Major depressive disorder with single episode, in full remission (HCC)  -     venlafaxine (EFFEXOR-XR) 75 mg 24 hr capsule; Take 1 capsule (75 mg total) by mouth daily  -     venlafaxine (EFFEXOR-XR) 150 mg 24 hr capsule; Take 1 capsule (150 mg total) by mouth daily  Refilled effexor as requested     Anxiety  -     venlafaxine (EFFEXOR-XR) 75 mg 24 hr capsule; Take 1 capsule (75 mg total) by mouth daily  -     venlafaxine (EFFEXOR-XR) 150 mg 24 hr capsule; Take 1 capsule (150 mg total) by mouth daily    Other orders  -     Continuous Blood Gluc Transmit (Dexcom G6 Transmitter) MISC; As directed          Subjective:      Patient ID: Kendell Rodas is a 59 y o  female with DM2, depression, anxiety, IBS being seen today to review recent labs and discuss her diabetes medications  She was seen on 5/19/22 for routine f/u  Labs ordered and were completed on 6/4/22  I never received her results  They were reviewed with patient today  Her A1c was up to 8 2 from 6 5 back in November  She is currently on metformin 500 mg 2 bid along with farxiga 10 mg daily  She denies any change in her diet or activity level  She has been to diabetic education in past and is very aware of what she should and should not be eating  There was also a bump in her creatinine from 0 75 last November to 1 23 on 6/4/22  Her GFR went from 86 down to 46  She has a dexcom which she loves and we reviewed her sugars today  Her fasting sugars have been elevated  No episodes of hypoglycemia  HPI    The following portions of the patient's history were reviewed and updated as appropriate:   She  has a past medical history of Anxiety, Breast cancer ([facility] [place] [address] ) (2008), Diabetes mellitus ([facility] [place] [address] ), IBS (irritable bowel syndrome), and Major depressive disorder with single episode, in full remission ([facility] [place] [address] )  She  has a past surgical history that includes Complete mastectomy w/ sentinel node biopsy (2008)  She  reports that she quit smoking about 23 years ago  Her smoking use included cigarettes  She has a 10 00 pack-year smoking history  She has never used smokeless tobacco  She reports current alcohol use  She reports that she does not use drugs    Current Outpatient Medications on File Prior to Visit   Medication Sig    atorvastatin (LIPITOR) 10 mg tablet Take 10 mg by mouth daily     Blood Glucose Monitoring Suppl (Contour Next EZ) w/Device KIT     Continuous Blood Gluc  (Dexcom G6 ) LENA Use 1 Units in the morning    Continuous Blood Gluc Transmit (Dexcom G6 Transmitter) MISC As directed    Contour Next Test test strip     lisinopril (ZESTRIL) 10 mg tablet Take 1 tablet (10 mg total) by mouth in the morning   metFORMIN (GLUCOPHAGE) 500 mg tablet Take 1,000 mg by mouth 2 (two) times a day with meals     Microlet Lancets MISC     [DISCONTINUED] clotrimazole-betamethasone (LOTRISONE) 1-0 05 % cream apply to affected area once daily if needed ITCHING FOR 7 DAYS    [DISCONTINUED] Continuous Blood Gluc Sensor (Dexcom G6 Sensor) MISC Use 1 Units in the morning    [DISCONTINUED] Farxiga 10 MG TABS Take 10 mg by mouth daily     [DISCONTINUED] venlafaxine (EFFEXOR-XR) 150 mg 24 hr capsule Take 150 mg by mouth daily     [DISCONTINUED] venlafaxine (EFFEXOR-XR) 75 mg 24 hr capsule Take 75 mg by mouth daily      No current facility-administered medications on file prior to visit  She is allergic to nuts - food allergy and sulfa antibiotics       Review of Systems      Objective:      /76   Pulse 102   Temp 98 3 °F (36 8 °C)   Ht 5' 8" (1 727 m)   Wt 75 1 kg (165 lb 8 oz)   SpO2 99%   BMI 25 16 kg/m²          Physical Exam  Vitals and nursing note reviewed  Constitutional:       General: She is not in acute distress  Appearance: Normal appearance  She is not ill-appearing, toxic-appearing or diaphoretic  HENT:      Head: Normocephalic and atraumatic  Eyes:      Conjunctiva/sclera: Conjunctivae normal    Cardiovascular:      Rate and Rhythm: Normal rate and regular rhythm  Heart sounds: No murmur heard  Pulmonary:      Effort: Pulmonary effort is normal       Breath sounds: Normal breath sounds  Musculoskeletal:      Cervical back: Normal range of motion and neck supple  Right lower leg: No edema  Left lower leg: No edema  Lymphadenopathy:      Cervical: No cervical adenopathy  Neurological:      Mental Status: She is alert

## 2022-07-08 NOTE — PATIENT INSTRUCTIONS
Continue your farxiga and metformin  Start lantus 10 units at bedtime  You may increase the dose by 2 units every 3 days for fasting sugars over 130  Please message me in 2 weeks with your readings

## 2022-07-11 ENCOUNTER — TELEPHONE (OUTPATIENT)
Dept: FAMILY MEDICINE CLINIC | Facility: CLINIC | Age: 62
End: 2022-07-11

## 2022-07-11 DIAGNOSIS — E11.9 TYPE 2 DIABETES MELLITUS WITHOUT COMPLICATION, WITHOUT LONG-TERM CURRENT USE OF INSULIN (HCC): Primary | ICD-10-CM

## 2022-07-11 RX ORDER — INSULIN GLARGINE 100 [IU]/ML
10 INJECTION, SOLUTION SUBCUTANEOUS
Qty: 10 ML | Refills: 1 | Status: SHIPPED | OUTPATIENT
Start: 2022-07-11

## 2022-07-11 NOTE — TELEPHONE ENCOUNTER
[facility] ([phone]) called to request an update to an rx  Lantus (long-acting insulin) is not covered by the patient's insurance  Pharmacist recommends Semglee (another long-acting insulin)  Pharmacist would like to know if Dr Johnie Brian office would like to send/call in another rx for Semglee instead of Lantus? Also, please confirm if the rx is for a vial or a pen, as Semglee can be ordered in a vial or a pen  Thank you

## 2022-07-11 NOTE — TELEPHONE ENCOUNTER
Of course  semglee is glargine insulin interchangeable with lantus   Will send rx for semglee pen rx

## 2022-07-19 ENCOUNTER — PATIENT MESSAGE (OUTPATIENT)
Dept: FAMILY MEDICINE CLINIC | Facility: CLINIC | Age: 62
End: 2022-07-19

## 2022-07-19 ENCOUNTER — TELEPHONE (OUTPATIENT)
Dept: FAMILY MEDICINE CLINIC | Facility: CLINIC | Age: 62
End: 2022-07-19

## 2022-07-19 NOTE — TELEPHONE ENCOUNTER
Regarding: Dexcom g6  ----- Message from Ely Bonilla DO sent at 7/19/2022  3:27 PM EDT -----       ----- Message sent from Ely Bonilla DO to Sergey WORKMAN at 7/19/2022  3:26 PM -----   Ella Mattch,     I did not see the rejection or reasoning for it  I will ask my MA if we have received anything stating such  I totally understand your concern and will look into it  If they do not approve the dexcom and therefore don't want to remain on insulin we could always try adding one of the new GLP1 receptor agonists such as Ozempic or its oral version, Rybelsus  Let me forward this to my MA and I will be in touch  Dr Ael Porter      ----- Message -----       From:Lynette Leyva       Sent:7/19/2022  2:38 PM EDT         To:[place] WORKMAN [facility] Afternoon Dr Ale Porter  [employer] has rejected the prior auth for the Dexcom g6  It was first rejected because I wasn't on insulin  Now that I am, it's saying it I should do this and that first       Is it possible to appeal the rejection? I'm not comfortable being on Insulin if I can't see what my numbers are doing  Currently it's helping some during the night  Not exactly where it should be but better   (around 130 to 150 at wake up) It is however, helping during the day  I'm thinking it has a lot to do with the heat as well  I don't have air at home  Anyway, I've gotten off subject  Please let me know if the prior authorization can be appealed  Thank you so much for your time!   Brayden Parish  [phone] (work  7:45 am to 3:45 pm M-TH ## Friday until 11:45 am)  [phone] (cell)

## 2022-07-19 NOTE — TELEPHONE ENCOUNTER
Agus Del Cid,    Thank you for this message  Unfortunately, the prior authorization request was never received by our office  Ghassan Snyder had the wrong contact information on file for our office  It is now updated and we have received the PA request from [facility]  I will submit and follow-up through covermymeds  I will be in touch with updates      Thanks,  Joseph Frank

## 2022-07-19 NOTE — PATIENT COMMUNICATION
Katelynn Valdovinos you know if we received anything regarding patient's dexcom denial (since I saw her last)  I don't see anything in the chart?

## 2022-07-19 NOTE — TELEPHONE ENCOUNTER
Regarding: Dexcom g6  ----- Message from Magy Zheng DO sent at 7/19/2022  3:27 PM EDT -----       ----- Message sent from Magy Zheng DO to Ericka WORKMAN at 7/19/2022  3:26 PM -----   Zurdo Hinkle,     I did not see the rejection or reasoning for it  I will ask my MA if we have received anything stating such  I totally understand your concern and will look into it  If they do not approve the dexcom and therefore don't want to remain on insulin we could always try adding one of the new GLP1 receptor agonists such as Ozempic or its oral version, Rybelsus  Let me forward this to my MA and I will be in touch  Dr Delmy Nye      ----- Message -----       From:Lynette Leyva       Sent:7/19/2022  2:38 PM EDT         To:Keya WORKMAN [facility] Afternoon Dr Delmy Nye  [employer] has rejected the prior auth for the Dexcom g6  It was first rejected because I wasn't on insulin  Now that I am, it's saying it I should do this and that first       Is it possible to appeal the rejection? I'm not comfortable being on Insulin if I can't see what my numbers are doing  Currently it's helping some during the night  Not exactly where it should be but better   (around 130 to 150 at wake up) It is however, helping during the day  I'm thinking it has a lot to do with the heat as well  I don't have air at home  Anyway, I've gotten off subject  Please let me know if the prior authorization can be appealed  Thank you so much for your time!   Tayla Vallejo  [phone] (work  7:45 am to 3:45 pm M-TH ## Friday until 11:45 am)  [phone] (cell)

## 2022-07-20 ENCOUNTER — TELEPHONE (OUTPATIENT)
Dept: FAMILY MEDICINE CLINIC | Facility: CLINIC | Age: 62
End: 2022-07-20

## 2022-07-20 NOTE — TELEPHONE ENCOUNTER
Prior Authorization is Approved -    I left a VM advising pt that our office has received notification that the Dexcom G6 sensor was approved

## 2022-08-15 NOTE — TELEPHONE ENCOUNTER
Reason for Disposition   [1] COVID-19 EXPOSURE (Close Contact) within last 14 days AND [2] NO cough, fever, or breathing difficulty    Answer Assessment - Initial Assessment Questions  1  CLOSE CONTACT: "Who is the person with the confirmed or suspected COVID-19 infection that you were exposed to?"      Co-worker  2  PLACE of CONTACT: "Where were you when you were exposed to COVID-19?" (e g , home, school, medical waiting room; which city?)      Work  3  TYPE of CONTACT: "How much contact was there?" (e g , sitting next to, live in same house, work in same office, same building)      Same room  4  DURATION of CONTACT: "How long were you in contact with the COVID-19 patient?" (e g , a few seconds, passed by person, a few minutes, live with the patient)      8 hours shift on 1 day  5  DATE of CONTACT: "When did you have contact with a COVID-19 patient?" (e g , how many days ago)      Within the last week  6  TRAVEL: "Have you traveled out of the country recently?" If so, "When and where?"      * Also ask about out-of-state travel, since the CDC has identified some high risk cities for community spread in the [address]  * Note: Travel becomes less relevant if there is widespread community transmission where the patient lives  Denies  7  COMMUNITY SPREAD: "Are there lots of cases of COVID-19 (community spread) where you live?" (See public health department website, if unsure)    * MAJOR community spread: high number of cases; numbers of cases are increasing; many people hospitalized  * MINOR community spread: low number of cases; not increasing; few or no people hospitalized      Major  8  SYMPTOMS: "Do you have any symptoms?" (e g , fever, cough, breathing difficulty)      N/A  9  PREGNANCY OR POSTPARTUM: "Is there any chance you are pregnant?" "When was your last menstrual period?" "Did you deliver in the last 2 weeks?"      N/A  10  HIGH RISK: "Do you have any heart or lung problems?  Do you have a weak immune system?" (e g , CHF, COPD, asthma, HIV positive, chemotherapy, renal failure, diabetes mellitus, sickle cell anemia)        Healthy    Protocols used: CORONAVIRUS (COVID-19) - EXPOSURE-ADULT-AH show

## 2022-08-29 ENCOUNTER — OFFICE VISIT (OUTPATIENT)
Dept: ENDOCRINOLOGY | Facility: HOSPITAL | Age: 62
End: 2022-08-29
Payer: COMMERCIAL

## 2022-08-29 VITALS
HEIGHT: 68 IN | HEART RATE: 86 BPM | WEIGHT: 166.2 LBS | DIASTOLIC BLOOD PRESSURE: 84 MMHG | BODY MASS INDEX: 25.19 KG/M2 | SYSTOLIC BLOOD PRESSURE: 124 MMHG

## 2022-08-29 DIAGNOSIS — E78.2 MIXED HYPERLIPIDEMIA: ICD-10-CM

## 2022-08-29 DIAGNOSIS — Z79.4 TYPE 2 DIABETES MELLITUS WITH HYPERGLYCEMIA, WITH LONG-TERM CURRENT USE OF INSULIN (HCC): Primary | ICD-10-CM

## 2022-08-29 DIAGNOSIS — I10 PRIMARY HYPERTENSION: ICD-10-CM

## 2022-08-29 DIAGNOSIS — E11.65 TYPE 2 DIABETES MELLITUS WITH HYPERGLYCEMIA, WITH LONG-TERM CURRENT USE OF INSULIN (HCC): Primary | ICD-10-CM

## 2022-08-29 PROCEDURE — 95251 CONT GLUC MNTR ANALYSIS I&R: CPT | Performed by: INTERNAL MEDICINE

## 2022-08-29 PROCEDURE — 99245 OFF/OP CONSLTJ NEW/EST HI 55: CPT | Performed by: INTERNAL MEDICINE

## 2022-08-29 NOTE — PROGRESS NOTES
8/29/2022    Assessment/Plan      Diagnoses and all orders for this visit:    Type 2 diabetes mellitus with hyperglycemia, with long-term current use of insulin ([facility] [address] )  -     Ambulatory referral to Diabetic Education; Future  -     HEMOGLOBIN A1C W/ EAG ESTIMATION Lab Collect; Future  -     Comprehensive metabolic panel Lab Collect; Future  -     CBC and differential Lab Collect; Future  -     TSH, 3rd generation Lab Collect; Future    Primary hypertension  -     HEMOGLOBIN A1C W/ EAG ESTIMATION Lab Collect; Future  -     Comprehensive metabolic panel Lab Collect; Future  -     CBC and differential Lab Collect; Future  -     TSH, 3rd generation Lab Collect; Future    Mixed hyperlipidemia  -     HEMOGLOBIN A1C W/ EAG ESTIMATION Lab Collect; Future  -     Comprehensive metabolic panel Lab Collect; Future  -     CBC and differential Lab Collect; Future  -     TSH, 3rd generation Lab Collect; Future        Assessment/Plan:  1  Type 2 diabetes, insulin requiring  Last hemoglobin A1c was 8 2% demonstrating poorly controlled diabetes  Insulin has been added since that time so I think her hemoglobin A1c now would be much better but she is not due for hemoglobin A1c until October 2022  For now, she will continue the same metformin, Farxiga, and semglee insulin  She looks to have some lower blood sugars in the afternoon and may need an afternoon healthy snack mixed of protein and carbohydrate  She seems to have higher blood sugars after meals at times using her Dexcom so I am going to refer her to Diabetes Education to work on carbohydrates post meal since the higher blood sugars seem to be after breakfast and after supper  She will continue to utilize the Dexcom G6 continuous glucose monitoring system  2  Hypertension  She is normotensive in the office on her current dose of lisinopril 10 mg daily  3  Hyperlipidemia  She will continue the same atorvastatin 10 mg daily        She will be getting blood work done in October consisting of a hemoglobin A1c, CMP, lipid panel, and urine microalbumin to creatinine ratio  I have asked her to follow up in January 2022 with preceding hemoglobin A1c, CMP, CBC, and TSH  CC: Diabetes Consult    History of Present Illness     HPI: Pavel Gregg is a 59y o  year old female with type 2 diabetes, insulin requiring for 11 years, hypertension, hyperlipidemia for evaluation/consult  She was originally started on metformin 500 mg twice a day which was then increased  Lexa Bee was added around 2020 about 18 months ago and insulin was added about 1 month ago  Her daughter wanted her to see endocrinology for evaluation as some of the Dexcom numbers are inappropriate  She is on oral agents and insulin at home and takes Lexa Bee 10 mg daily, metformin 1000 mg twice a day, and Semglee insulin 10 units at bedtime  She denies any polyphagia, polydipsia, nocturia, and blurry vision  She has some polyuria but admits to drinking a lot  She has occasional sensation of toes feeling as if they have fallen asleep  She denies chest pain or shortness of breath  She denies nephropathy, retinopathy, heart attack, stroke and claudication but does admit to neuropathy  She did see Diabetes education on diagnosis in 2011 but thinks she probably needs a refresher  Hypoglycemic episodes: Yes occasional   H/o of hypoglycemia causing hospitalization or intervention such as glucagon injection  or ambulance call  No     The patient's last eye exam was in June 2022 with no retinopathy  The patient's last foot exam was by her PCP  Last A1C was   Lab Results   Component Value Date    HGBA1C 8 2 (H) 06/04/2022     Blood Sugar/Glucometer/Pump/CGM review:  She utilizes a Dexcom G6 continuous glucose monitoring system to test her blood sugars at least 6 times a day  Dexcom download from 07/31/2022 through 08/29/2022 was reviewed the office today    Average glucose is 149 mg/dL with a standard deviation of 41 mg/dL  80% of blood sugars are in target range, 17% high, 2% very high, less than 1% low, and less than 1% very low  Overall, her Dexcom download does show a minor increase in blood sugar after breakfast and a bit higher increase in blood sugar after supper  There are some lower blood sugars in the mid afternoon  She has hypertension and takes lisinopril 10 mg daily  She has occasional headaches but no stroke-like symptoms  She has hyperlipidemia and takes atorvastatin 10 mg daily  She denies chest pain or shortness of breath  Review of Systems   Constitutional: Positive for fatigue  Negative for unexpected weight change  Fatigue with the heat  HENT: Negative for hearing loss, tinnitus and trouble swallowing  No XRT to the head or neck in the past    Eyes: Negative for visual disturbance  Wears glasses  No diplopia  Respiratory: Negative for chest tightness and shortness of breath  Cardiovascular: Negative for chest pain, palpitations and leg swelling  Gastrointestinal: Positive for constipation and diarrhea  Negative for abdominal pain and nausea  Alternating diarrhea and constipation with the IBS  Diarrhea more in the heat  Endocrine: Positive for polyuria  Negative for polydipsia and polyphagia  Some polyuria some days  But was drinking a lot  Nocturia not recently  Musculoskeletal: Negative for arthralgias and back pain  Skin: Negative for wound  Neurological: Positive for numbness and headaches  Negative for dizziness, tremors, weakness and light-headedness  Occasional toes feel asleep  This occurs laying down at night  occasional headaches  Psychiatric/Behavioral: Negative for dysphoric mood and sleep disturbance  The patient is not nervous/anxious  Will wake at night and go back to sleep  Historical Information   Past Medical History:   Diagnosis Date    Anxiety     Breast cancer ([facility] [address] ) 2008    right  bilateral mastectomy   Had chemo    Diabetes mellitus ([facility] [address] )     dx 2010    IBS (irritable bowel syndrome)     Major depressive disorder with single episode, in full remission [facility])      Past Surgical History:   Procedure Laterality Date    COMPLETE MASTECTOMY W/ SENTINEL NODE BIOPSY Bilateral 2008    expanders placed    INCISION AND DRAINAGE (I&D) CHEST WALL      breast infection post chemo    IR PORT REMOVAL AND PLACEMENT NEW SITE       Social History   Social History     Substance and Sexual Activity   Alcohol Use Yes    Comment: 2 drinks per month     Social History     Substance and Sexual Activity   Drug Use Never     Social History     Tobacco Use   Smoking Status Former Smoker    Packs/day: 0 50    Years: 20 00    Pack years: 10 00    Types: Cigarettes    Quit date: 34    Years since quittin 6   Smokeless Tobacco Never Used   Tobacco Comment    stopped 16 years ago     Family History:   Family History   Problem Relation Age of Onset    No Known Problems Mother     Alzheimer's disease Father     Heart disease Maternal Grandmother     Alzheimer's disease Paternal Grandmother     Fibromyalgia Daughter     Migraines Daughter     Anemia Daughter     No Known Problems Son        Meds/Allergies   Current Outpatient Medications   Medication Sig Dispense Refill    atorvastatin (LIPITOR) 10 mg tablet Take 10 mg by mouth daily       Blood Glucose Monitoring Suppl (Contour Next EZ) w/Device KIT       clotrimazole-betamethasone (LOTRISONE) 1-0 05 % cream Apply topically 2 (two) times a day 30 g 1    Continuous Blood Gluc  (Dexcom G6 ) LENA Use 1 Units in the morning 1 each 0    Continuous Blood Gluc Sensor (Dexcom G6 Sensor) MISC Use 1 Units in the morning 9 each 1    Continuous Blood Gluc Transmit (Dexcom G6 Transmitter) MISC As directed      Contour Next Test test strip       Farxiga 10 MG TABS Take 1 tablet (10 mg total) by mouth daily 90 tablet 1    insulin glargine (Semglee) 100 units/mL subcutaneous injection Inject 10 Units under the skin daily at bedtime 10 mL 1    lisinopril (ZESTRIL) 10 mg tablet Take 1 tablet (10 mg total) by mouth in the morning  90 tablet 1    metFORMIN (GLUCOPHAGE) 500 mg tablet Take 1,000 mg by mouth 2 (two) times a day with meals       Microlet Lancets MISC       venlafaxine (EFFEXOR-XR) 150 mg 24 hr capsule Take 1 capsule (150 mg total) by mouth daily 90 capsule 1    venlafaxine (EFFEXOR-XR) 75 mg 24 hr capsule Take 1 capsule (75 mg total) by mouth daily 90 capsule 1     No current facility-administered medications for this visit  Allergies   Allergen Reactions    Nuts - Food Allergy Vomiting     Eyes can swell    Sulfa Antibiotics GI Intolerance       Objective   Vitals: Blood pressure 124/84, pulse 86, height 5' 8" (1 727 m), weight 75 4 kg (166 lb 3 2 oz)  Invasive Devices  Report    None                 Physical Exam  Vitals reviewed  Constitutional:       Appearance: Normal appearance  She is well-developed  HENT:      Head: Normocephalic and atraumatic  Eyes:      Extraocular Movements: Extraocular movements intact  Conjunctiva/sclera: Conjunctivae normal       Comments: No lid lag, stare, proptosis, or periorbital edema  Neck:      Thyroid: No thyromegaly  Vascular: No carotid bruit  Comments: Thyroid normal in size  Cardiovascular:      Rate and Rhythm: Normal rate and regular rhythm  Pulses: Normal pulses  no weak pulses          Dorsalis pedis pulses are 2+ on the right side and 2+ on the left side  Posterior tibial pulses are 2+ on the right side and 2+ on the left side  Heart sounds: Normal heart sounds  No murmur heard  Pulmonary:      Effort: Pulmonary effort is normal       Breath sounds: Normal breath sounds  No wheezing  Abdominal:      General: Bowel sounds are normal       Palpations: Abdomen is soft  Tenderness: There is no abdominal tenderness     Musculoskeletal: General: No deformity  Normal range of motion  Cervical back: Normal range of motion and neck supple  Right lower leg: No edema  Left lower leg: No edema  Comments: No tremor of the outstretched hands  No spinous process tenderness  No CVA tenderness  No ulcerations of the feet  Feet:      Right foot:      Skin integrity: No ulcer, skin breakdown, erythema, warmth, callus or dry skin  Left foot:      Skin integrity: No ulcer, skin breakdown, erythema, warmth, callus or dry skin  Lymphadenopathy:      Cervical: No cervical adenopathy  Skin:     General: Skin is warm and dry  Findings: No rash  Neurological:      Mental Status: She is alert and oriented to person, place, and time  Deep Tendon Reflexes: Reflexes are normal and symmetric  Comments: Vibration sensation diminished to the 1st toe DIP joint bilaterally  Microfilament sensation intact bilaterally  Patellar deep tendon reflexes normal       Patient's shoes and socks removed  Right Foot/Ankle   Right Foot Inspection  Skin Exam: skin normal and skin intact  No dry skin, no warmth, no callus, no erythema, no maceration, no abnormal color, no pre-ulcer, no ulcer and no callus  Toe Exam: ROM and strength within normal limits  No swelling and  no right toe deformity    Sensory   Vibration: diminished  Monofilament testing: intact    Vascular  Capillary refills: < 3 seconds  The right DP pulse is 2+  The right PT pulse is 2+  Left Foot/Ankle  Left Foot Inspection  Skin Exam: skin normal and skin intact  No dry skin, no warmth, no erythema, no maceration, normal color, no pre-ulcer, no ulcer and no callus  Toe Exam: ROM and strength within normal limits  No swelling and no left toe deformity  Sensory   Vibration: diminished  Monofilament testing: intact    Vascular  Capillary refills: < 3 seconds  The left DP pulse is 2+  The left PT pulse is 2+       Assign Risk Category  No deformity present  Loss of protective sensation  No weak pulses  Risk: 1        The history was obtained from the review of the chart and from the patient  Lab Results:    Most recent Alc is  Lab Results   Component Value Date    HGBA1C 8 2 (H) 06/04/2022           Blood work done on 06/04/2022 showed a CMP with a glucose of 408 fasting but was otherwise normal     Lab Results   Component Value Date    CREATININE 1 23 06/04/2022    CREATININE 0 75 11/06/2021    BUN 11 06/04/2022    K 3 8 06/04/2022     06/04/2022    CO2 24 06/04/2022     eGFR   Date Value Ref Range Status   06/04/2022 46 ml/min/1 73sq m Final     Total cholesterol 190, LDL cholesterol 106    Lab Results   Component Value Date    HDL 42 (L) 06/04/2022    TRIG 212 (H) 06/04/2022       Lab Results   Component Value Date    ALT 31 06/04/2022    AST 17 06/04/2022    ALKPHOS 132 (H) 06/04/2022               Future Appointments   Date Time Provider Yvonne Macdonaldi   2/6/2023  9:20 AM Haily Posey MD [facility]

## 2022-08-29 NOTE — PATIENT INSTRUCTIONS
Hgba1c was last 8 2%  I think this will be better with the addition of insulin  Continue the same metformin, farxiga and semglee insulin  Let's get you to diabetes education to work on the meals since the dexcom mostly shows high after breakfast and supper  Low blood sugars seem to occur in the afternoon, may need an afternoon healthy snack  Protein and cab mixture  Continue to use the dexcom  Follow up in jan 2023 with blood work  Make sure to do the blood work in oct 2022

## 2022-09-09 ENCOUNTER — TELEPHONE (OUTPATIENT)
Dept: FAMILY MEDICINE CLINIC | Facility: CLINIC | Age: 62
End: 2022-09-09

## 2022-09-09 DIAGNOSIS — E11.65 TYPE 2 DIABETES MELLITUS WITH HYPERGLYCEMIA, WITH LONG-TERM CURRENT USE OF INSULIN (HCC): Primary | ICD-10-CM

## 2022-09-09 DIAGNOSIS — Z79.4 TYPE 2 DIABETES MELLITUS WITH HYPERGLYCEMIA, WITH LONG-TERM CURRENT USE OF INSULIN (HCC): Primary | ICD-10-CM

## 2022-09-09 RX ORDER — BLOOD-GLUCOSE TRANSMITTER
EACH MISCELLANEOUS
Qty: 1 EACH | Refills: 1 | Status: SHIPPED | OUTPATIENT
Start: 2022-09-09

## 2022-09-09 NOTE — TELEPHONE ENCOUNTER
Pt called in to request an rx refill for her Dexcom G6 Transmitter  Dr Allyson Arambula approved and sent rx to [facility]  As per pt, a prior auth may be needed for this rx  I spoke with Taty at [facility] and she stated that it generally takes 1 day to process incoming rx requests  [facility] will not know if a Prior Auth is needed for the Transmitter for another day or so  I called the pt and she is aware

## 2022-09-09 NOTE — TELEPHONE ENCOUNTER
Pt requests refill of Dexcom G6 Transmitter  As per pt, may need a prior authorization  Please update as appropriate and send rx to:   Yakov Matthew

## 2022-09-09 NOTE — TELEPHONE ENCOUNTER
Pt is aware that her rx was sent to [facility]  Pt is also aware that [facility] stated that it takes approximately 1 day for the rx to be processed and to know whether a prior auth will be needed  Awaiting processing  Pt confirmed understanding

## 2022-09-13 NOTE — TELEPHONE ENCOUNTER
I left a VM for the patient, advising that the [facility] G6 Transmitter did not need a prior authorization  As per Taty at [facility], the [address] was mailed today and the patient should receive it soon  Pt advised via detailed VM to call our office if there are any questions

## 2022-10-11 ENCOUNTER — TELEPHONE (OUTPATIENT)
Dept: FAMILY MEDICINE CLINIC | Facility: CLINIC | Age: 62
End: 2022-10-11

## 2022-11-14 ENCOUNTER — APPOINTMENT (EMERGENCY)
Dept: RADIOLOGY | Facility: HOSPITAL | Age: 62
End: 2022-11-14

## 2022-11-14 ENCOUNTER — HOSPITAL ENCOUNTER (EMERGENCY)
Facility: HOSPITAL | Age: 62
Discharge: HOME | End: 2022-11-15
Attending: EMERGENCY MEDICINE

## 2022-11-14 DIAGNOSIS — J20.9 ACUTE BRONCHITIS: Primary | ICD-10-CM

## 2022-11-14 RX ADMIN — IPRATROPIUM BROMIDE 0.5 MG: 0.5 SOLUTION RESPIRATORY (INHALATION) at 23:48

## 2022-11-14 RX ADMIN — ALBUTEROL SULFATE 5 MG: 2.5 SOLUTION RESPIRATORY (INHALATION) at 23:48

## 2022-11-14 NOTE — Clinical Note
Willis Pham was seen and treated in our emergency department on 11/14/2022  Diagnosis:     Lynette    She may return on this date: 11/18/2022         If you have any questions or concerns, please don't hesitate to call        Juan Alberto Cole DO    ______________________________           _______________          _______________  Hospital Representative                              Date                                Time

## 2022-11-14 NOTE — Clinical Note
James Tristanffield was seen and treated in our emergency department on 11/14/2022  Diagnosis:     Lynette    She may return on this date: If you have any questions or concerns, please don't hesitate to call        Puma Barreto, DO    ______________________________           _______________          _______________  Hospital Representative                              Date                                Time

## 2022-11-14 NOTE — Clinical Note
Clara Potter was seen and treated in our emergency department on 11/14/2022  Diagnosis:     Lynette    She may return on this date: If you have any questions or concerns, please don't hesitate to call        Robert Buchanan DO    ______________________________           _______________          _______________  Hospital Representative                              Date                                Time

## 2022-11-15 ENCOUNTER — DOCUMENTATION (OUTPATIENT)
Dept: EMERGENCY DEPT | Facility: HOSPITAL | Age: 62
End: 2022-11-15

## 2022-11-15 VITALS
RESPIRATION RATE: 18 BRPM | SYSTOLIC BLOOD PRESSURE: 140 MMHG | WEIGHT: 166 LBS | OXYGEN SATURATION: 94 % | DIASTOLIC BLOOD PRESSURE: 69 MMHG | BODY MASS INDEX: 25.16 KG/M2 | HEIGHT: 68 IN | HEART RATE: 112 BPM | TEMPERATURE: 97.8 F

## 2022-11-15 RX ORDER — AMOXICILLIN 500 MG/1
1000 CAPSULE ORAL EVERY 8 HOURS SCHEDULED
Qty: 40 CAPSULE | Refills: 0 | Status: SHIPPED | OUTPATIENT
Start: 2022-11-15 | End: 2022-11-22

## 2022-11-15 RX ORDER — AMOXICILLIN 250 MG/1
1000 CAPSULE ORAL ONCE
Status: COMPLETED | OUTPATIENT
Start: 2022-11-15 | End: 2022-11-15

## 2022-11-15 RX ORDER — PREDNISONE 20 MG/1
40 TABLET ORAL DAILY
Qty: 10 TABLET | Refills: 0 | Status: SHIPPED | OUTPATIENT
Start: 2022-11-15 | End: 2022-11-20

## 2022-11-15 RX ORDER — ALBUTEROL SULFATE 90 UG/1
2 AEROSOL, METERED RESPIRATORY (INHALATION) ONCE
Status: COMPLETED | OUTPATIENT
Start: 2022-11-15 | End: 2022-11-15

## 2022-11-15 RX ORDER — PREDNISONE 20 MG/1
40 TABLET ORAL ONCE
Status: COMPLETED | OUTPATIENT
Start: 2022-11-15 | End: 2022-11-15

## 2022-11-15 RX ADMIN — PREDNISONE 40 MG: 20 TABLET ORAL at 01:24

## 2022-11-15 RX ADMIN — AMOXICILLIN 1000 MG: 250 CAPSULE ORAL at 01:24

## 2022-11-15 RX ADMIN — ALBUTEROL SULFATE 2 PUFF: 90 AEROSOL, METERED RESPIRATORY (INHALATION) at 01:25

## 2023-01-10 DIAGNOSIS — E11.9 TYPE 2 DIABETES MELLITUS WITHOUT COMPLICATION, WITHOUT LONG-TERM CURRENT USE OF INSULIN (HCC): ICD-10-CM

## 2023-01-10 RX ORDER — INSULIN GLARGINE-YFGN 100 [IU]/ML
INJECTION, SOLUTION SUBCUTANEOUS
Qty: 10 ML | Refills: 0 | Status: SHIPPED | OUTPATIENT
Start: 2023-01-10

## 2023-03-01 DIAGNOSIS — E11.65 TYPE 2 DIABETES MELLITUS WITH HYPERGLYCEMIA, WITH LONG-TERM CURRENT USE OF INSULIN (HCC): ICD-10-CM

## 2023-03-01 DIAGNOSIS — F41.9 ANXIETY: ICD-10-CM

## 2023-03-01 DIAGNOSIS — Z79.4 TYPE 2 DIABETES MELLITUS WITH HYPERGLYCEMIA, WITH LONG-TERM CURRENT USE OF INSULIN (HCC): ICD-10-CM

## 2023-03-01 DIAGNOSIS — E11.9 TYPE 2 DIABETES MELLITUS WITHOUT COMPLICATION, WITHOUT LONG-TERM CURRENT USE OF INSULIN (HCC): ICD-10-CM

## 2023-03-01 DIAGNOSIS — F32.5 MAJOR DEPRESSIVE DISORDER WITH SINGLE EPISODE, IN FULL REMISSION (HCC): ICD-10-CM

## 2023-03-01 NOTE — TELEPHONE ENCOUNTER
I left a VM advising pt to call the office regarding her rx refill requests and a message from Dr Keya Clarke  Awaiting call back

## 2023-03-01 NOTE — TELEPHONE ENCOUNTER
Patient overdue for labs and f/u visit  Please call her to schedule so that I can send her rx refills to pharmacy  She was also a no show to her recent endocrinology appt

## 2023-03-03 NOTE — TELEPHONE ENCOUNTER
Is there an alternative phone number to reach patient or maybe send my chart message? ?  I don't want her to run out of medications but cannot just keep refilling meds without labs/appointment  I am afraid if I send rx for #90 days that will be 90 more days until she goes for labs and comes in for appt  Cannot send #30 day because she uses [facility]

## 2023-03-06 NOTE — TELEPHONE ENCOUNTER
I left another detailed VM advising pt to call to schedule medication management OV with Dr Erlin Ibanez as it is important to monitor her health while on medications, and that Dr Erlin Ibanez does not want her to fall behind in her care  Pt reminded that she can call or message us via [employer] at her convenience

## 2023-03-08 RX ORDER — DAPAGLIFLOZIN 10 MG/1
10 TABLET, FILM COATED ORAL DAILY
Qty: 90 TABLET | Refills: 0 | Status: SHIPPED | OUTPATIENT
Start: 2023-03-08

## 2023-03-08 RX ORDER — INSULIN GLARGINE-YFGN 100 [IU]/ML
10 INJECTION, SOLUTION SUBCUTANEOUS
Qty: 10 ML | Refills: 0 | Status: SHIPPED | OUTPATIENT
Start: 2023-03-08

## 2023-03-08 RX ORDER — VENLAFAXINE HYDROCHLORIDE 150 MG/1
150 CAPSULE, EXTENDED RELEASE ORAL DAILY
Qty: 90 CAPSULE | Refills: 0 | Status: SHIPPED | OUTPATIENT
Start: 2023-03-08

## 2023-03-08 RX ORDER — BLOOD-GLUCOSE TRANSMITTER
EACH MISCELLANEOUS
Qty: 1 EACH | Refills: 0 | Status: SHIPPED | OUTPATIENT
Start: 2023-03-08

## 2023-03-08 RX ORDER — BLOOD-GLUCOSE SENSOR
1 EACH MISCELLANEOUS DAILY
Qty: 9 EACH | Refills: 0 | Status: SHIPPED | OUTPATIENT
Start: 2023-03-08

## 2023-03-08 RX ORDER — LISINOPRIL 10 MG/1
10 TABLET ORAL DAILY
Qty: 90 TABLET | Refills: 0 | Status: SHIPPED | OUTPATIENT
Start: 2023-03-08

## 2023-03-08 RX ORDER — CLOTRIMAZOLE AND BETAMETHASONE DIPROPIONATE 10; .64 MG/G; MG/G
CREAM TOPICAL 2 TIMES DAILY
Qty: 30 G | Refills: 0 | Status: SHIPPED | OUTPATIENT
Start: 2023-03-08

## 2023-03-08 RX ORDER — VENLAFAXINE HYDROCHLORIDE 75 MG/1
75 CAPSULE, EXTENDED RELEASE ORAL DAILY
Qty: 90 CAPSULE | Refills: 0 | Status: SHIPPED | OUTPATIENT
Start: 2023-03-08

## 2023-03-08 NOTE — TELEPHONE ENCOUNTER
Pt is now scheduled for Annual Physical/ Med Management on 3/27/2023  Pt is aware to complete labs ahead of appmt to monitor health status for med management  Pt stated that she will try to complete the labs this weekend

## 2023-03-18 ENCOUNTER — APPOINTMENT (OUTPATIENT)
Dept: LAB | Facility: HOSPITAL | Age: 63
End: 2023-03-18

## 2023-03-18 DIAGNOSIS — E11.65 TYPE 2 DIABETES MELLITUS WITH HYPERGLYCEMIA, WITH LONG-TERM CURRENT USE OF INSULIN (HCC): ICD-10-CM

## 2023-03-18 DIAGNOSIS — E11.9 TYPE 2 DIABETES MELLITUS WITHOUT COMPLICATION, WITHOUT LONG-TERM CURRENT USE OF INSULIN (HCC): ICD-10-CM

## 2023-03-18 DIAGNOSIS — I10 PRIMARY HYPERTENSION: ICD-10-CM

## 2023-03-18 DIAGNOSIS — Z79.4 TYPE 2 DIABETES MELLITUS WITH HYPERGLYCEMIA, WITH LONG-TERM CURRENT USE OF INSULIN (HCC): ICD-10-CM

## 2023-03-18 DIAGNOSIS — E78.2 MIXED HYPERLIPIDEMIA: ICD-10-CM

## 2023-03-18 LAB
ALBUMIN SERPL BCP-MCNC: 4.4 G/DL (ref 3.5–5)
ALP SERPL-CCNC: 91 U/L (ref 34–104)
ALT SERPL W P-5'-P-CCNC: 15 U/L (ref 7–52)
ANION GAP SERPL CALCULATED.3IONS-SCNC: 8 MMOL/L (ref 4–13)
AST SERPL W P-5'-P-CCNC: 15 U/L (ref 13–39)
BASOPHILS # BLD AUTO: 0.06 THOUSANDS/ÂΜL (ref 0–0.1)
BASOPHILS NFR BLD AUTO: 1 % (ref 0–1)
BILIRUB SERPL-MCNC: 0.5 MG/DL (ref 0.2–1)
BUN SERPL-MCNC: 12 MG/DL (ref 5–25)
CALCIUM SERPL-MCNC: 9.5 MG/DL (ref 8.4–10.2)
CHLORIDE SERPL-SCNC: 102 MMOL/L (ref 96–108)
CHOLEST SERPL-MCNC: 164 MG/DL
CO2 SERPL-SCNC: 30 MMOL/L (ref 21–32)
CREAT SERPL-MCNC: 0.84 MG/DL (ref 0.6–1.3)
CREAT UR-MCNC: 78.1 MG/DL
EOSINOPHIL # BLD AUTO: 0.17 THOUSAND/ÂΜL (ref 0–0.61)
EOSINOPHIL NFR BLD AUTO: 2 % (ref 0–6)
ERYTHROCYTE [DISTWIDTH] IN BLOOD BY AUTOMATED COUNT: 12.4 % (ref 11.6–15.1)
EST. AVERAGE GLUCOSE BLD GHB EST-MCNC: 137 MG/DL
GFR SERPL CREATININE-BSD FRML MDRD: 73 ML/MIN/1.73SQ M
GLUCOSE SERPL-MCNC: 153 MG/DL (ref 65–140)
HBA1C MFR BLD: 6.4 %
HCT VFR BLD AUTO: 45.3 % (ref 34.8–46.1)
HDLC SERPL-MCNC: 45 MG/DL
HGB BLD-MCNC: 15.5 G/DL (ref 11.5–15.4)
IMM GRANULOCYTES # BLD AUTO: 0.05 THOUSAND/UL (ref 0–0.2)
IMM GRANULOCYTES NFR BLD AUTO: 1 % (ref 0–2)
LDLC SERPL CALC-MCNC: 93 MG/DL (ref 0–100)
LYMPHOCYTES # BLD AUTO: 2.03 THOUSANDS/ÂΜL (ref 0.6–4.47)
LYMPHOCYTES NFR BLD AUTO: 23 % (ref 14–44)
MCH RBC QN AUTO: 29.7 PG (ref 26.8–34.3)
MCHC RBC AUTO-ENTMCNC: 34.2 G/DL (ref 31.4–37.4)
MCV RBC AUTO: 87 FL (ref 82–98)
MICROALBUMIN UR-MCNC: 8 MG/L (ref 0–20)
MICROALBUMIN/CREAT 24H UR: 10 MG/G CREATININE (ref 0–30)
MONOCYTES # BLD AUTO: 0.45 THOUSAND/ÂΜL (ref 0.17–1.22)
MONOCYTES NFR BLD AUTO: 5 % (ref 4–12)
NEUTROPHILS # BLD AUTO: 6.03 THOUSANDS/ÂΜL (ref 1.85–7.62)
NEUTS SEG NFR BLD AUTO: 68 % (ref 43–75)
NONHDLC SERPL-MCNC: 119 MG/DL
NRBC BLD AUTO-RTO: 0 /100 WBCS
PLATELET # BLD AUTO: 364 THOUSANDS/UL (ref 149–390)
PMV BLD AUTO: 9.1 FL (ref 8.9–12.7)
POTASSIUM SERPL-SCNC: 4 MMOL/L (ref 3.5–5.3)
PROT SERPL-MCNC: 7 G/DL (ref 6.4–8.4)
RBC # BLD AUTO: 5.22 MILLION/UL (ref 3.81–5.12)
SODIUM SERPL-SCNC: 140 MMOL/L (ref 135–147)
TRIGL SERPL-MCNC: 132 MG/DL
TSH SERPL DL<=0.05 MIU/L-ACNC: 1.61 UIU/ML (ref 0.45–4.5)
WBC # BLD AUTO: 8.79 THOUSAND/UL (ref 4.31–10.16)

## 2023-03-23 ENCOUNTER — TELEPHONE (OUTPATIENT)
Dept: FAMILY MEDICINE CLINIC | Facility: CLINIC | Age: 63
End: 2023-03-23

## 2023-03-27 ENCOUNTER — OFFICE VISIT (OUTPATIENT)
Dept: FAMILY MEDICINE CLINIC | Facility: CLINIC | Age: 63
End: 2023-03-27

## 2023-03-27 VITALS
BODY MASS INDEX: 25.96 KG/M2 | HEART RATE: 87 BPM | HEIGHT: 67 IN | OXYGEN SATURATION: 97 % | WEIGHT: 165.4 LBS | DIASTOLIC BLOOD PRESSURE: 60 MMHG | TEMPERATURE: 99.3 F | SYSTOLIC BLOOD PRESSURE: 129 MMHG

## 2023-03-27 DIAGNOSIS — Z00.00 ENCOUNTER FOR ANNUAL PHYSICAL EXAM: ICD-10-CM

## 2023-03-27 DIAGNOSIS — I10 PRIMARY HYPERTENSION: ICD-10-CM

## 2023-03-27 DIAGNOSIS — F32.5 MAJOR DEPRESSIVE DISORDER WITH SINGLE EPISODE, IN FULL REMISSION (HCC): ICD-10-CM

## 2023-03-27 DIAGNOSIS — Z79.4 TYPE 2 DIABETES MELLITUS WITH HYPERGLYCEMIA, WITH LONG-TERM CURRENT USE OF INSULIN (HCC): ICD-10-CM

## 2023-03-27 DIAGNOSIS — F41.9 ANXIETY: ICD-10-CM

## 2023-03-27 DIAGNOSIS — E11.65 TYPE 2 DIABETES MELLITUS WITH HYPERGLYCEMIA, WITH LONG-TERM CURRENT USE OF INSULIN (HCC): ICD-10-CM

## 2023-03-27 DIAGNOSIS — Z23 ENCOUNTER FOR IMMUNIZATION: ICD-10-CM

## 2023-03-27 DIAGNOSIS — E78.2 MIXED HYPERLIPIDEMIA: ICD-10-CM

## 2023-03-27 DIAGNOSIS — E11.9 TYPE 2 DIABETES MELLITUS WITHOUT COMPLICATION, WITHOUT LONG-TERM CURRENT USE OF INSULIN (HCC): ICD-10-CM

## 2023-03-27 DIAGNOSIS — K58.2 IRRITABLE BOWEL SYNDROME WITH BOTH CONSTIPATION AND DIARRHEA: Primary | ICD-10-CM

## 2023-03-27 RX ORDER — VENLAFAXINE HYDROCHLORIDE 150 MG/1
300 CAPSULE, EXTENDED RELEASE ORAL DAILY
Qty: 180 CAPSULE | Refills: 1 | Status: SHIPPED | OUTPATIENT
Start: 2023-03-27

## 2023-03-27 RX ORDER — ATORVASTATIN CALCIUM 10 MG/1
10 TABLET, FILM COATED ORAL DAILY
Qty: 90 TABLET | Refills: 1 | Status: SHIPPED | OUTPATIENT
Start: 2023-03-27

## 2023-03-27 RX ORDER — INSULIN GLARGINE-YFGN 100 [IU]/ML
10 INJECTION, SOLUTION SUBCUTANEOUS
Qty: 10 ML | Refills: 0 | Status: SHIPPED | OUTPATIENT
Start: 2023-03-27 | End: 2023-03-27

## 2023-03-27 RX ORDER — DAPAGLIFLOZIN 10 MG/1
10 TABLET, FILM COATED ORAL DAILY
Qty: 90 TABLET | Refills: 1 | Status: SHIPPED | OUTPATIENT
Start: 2023-03-27

## 2023-03-27 RX ORDER — LISINOPRIL 10 MG/1
10 TABLET ORAL DAILY
Qty: 90 TABLET | Refills: 1 | Status: SHIPPED | OUTPATIENT
Start: 2023-03-27

## 2023-03-27 RX ORDER — BLOOD-GLUCOSE,RECEIVER,CONT
1 EACH MISCELLANEOUS DAILY
Qty: 1 EACH | Refills: 1 | Status: SHIPPED | OUTPATIENT
Start: 2023-03-27

## 2023-03-27 RX ORDER — BLOOD-GLUCOSE SENSOR
1 EACH MISCELLANEOUS DAILY
Qty: 9 EACH | Refills: 1 | Status: SHIPPED | OUTPATIENT
Start: 2023-03-27

## 2023-03-27 NOTE — PROGRESS NOTES
Name: Jair Boudreaux      : 1960      MRN: [id number]  Encounter Provider: Jade Obrien DO  Encounter Date: 3/27/2023   Encounter department: [address] [place] [address]  Irritable bowel syndrome with both constipation and diarrhea  Stable  Gets diarrhea alternating with constipation with intermittent bloating  Question how much is related to the metformin  Discussed changing to XR formulation  2  Type 2 diabetes mellitus with hyperglycemia, with long-term current use of insulin (HCC)  -     Continuous Blood Gluc  (Dexcom G7 ) LENA; Use 1 Units in the morning  -     Continuous Blood Gluc Sensor (Dexcom G7 Sensor) MISC; Use 1 Units in the morning  -     Comprehensive metabolic panel; Future; Expected date: 2023  -     HEMOGLOBIN A1C W/ EAG ESTIMATION; Future; Expected date: 2023  Lab Results   Component Value Date    HGBA1C 6 4 (H) 2023     Her diabetes is well controlled  Foot exam up to date  Will have her hold the insulin glargine that she is taking at hs  Continue monitoring sugars  New dexcom sent for updated (G7) version  Will call if sugars trend higher  3  Primary hypertension  At goal    4  Major depressive disorder with single episode, in full remission ([facility] [address] )  -     venlafaxine (EFFEXOR-XR) 150 mg 24 hr capsule; Take 2 capsules (300 mg total) by mouth daily  Increase effexor xr from 225 to 300 mg daily  5  Anxiety  -     venlafaxine (EFFEXOR-XR) 150 mg 24 hr capsule; Take 2 capsules (300 mg total) by mouth daily    6  Mixed hyperlipidemia  -     atorvastatin (LIPITOR) 10 mg tablet; Take 1 tablet (10 mg total) by mouth daily  -     Lipid panel; Future; Expected date: 2023  Lipids at goal    Continue atorvastatin  7  Encounter for immunization  -     Pneumococcal Conjugate Vaccine 20-valent (PCV20)    8   Type 2 diabetes mellitus without complication, without long-term current use of insulin (HCC)  - lisinopril (ZESTRIL) 10 mg tablet; Take 1 tablet (10 mg total) by mouth daily  -     metFORMIN (GLUCOPHAGE) 500 mg tablet; Take 2 tablets (1,000 mg total) by mouth 2 (two) times a day with meals  -     Farxiga 10 MG tablet; Take 1 tablet (10 mg total) by mouth daily  9  Encounter for annual physical exam  Discussed diet and exercise  Patient is overdue for colon cancer screening  Has cologuard at home and was encouraged to send this in  Immunization History   Administered Date(s) Administered   • COVID-19 MODERNA VACC 0 5 ML IM 01/05/2021, 02/05/2021   • Pneumococcal Conjugate Vaccine 20-valent (Pcv20), Polysace 03/27/2023   • Tdap 07/08/2022     prevnar 20 ordered today  Had flu vaccine this season through [facility]  Cervical cancer screen up to date  Has never had dexa  Was ordered at a previous visit but never completed  Subjective     HPI   Patient is a 57year old female with hypertension, hyperlipidemia DM2, IBS, anxiety and depression who is being seen today for her physical and for f/u on these chronic conditions and review of labs done 3/18/23  She is overdue for colon cancer screening  She has a cologuard at home and does plan to submit  Cervical cancer screening is up to date (6/17/21)  She sees dentist    Wears glasses and eye exams are up to date  Not exercising regularly  Eats healthy diet  Cut out soda completely  Patient Active Problem List   Diagnosis   • History of breast cancer--s/p bilateral mastectomy   • Type 2 diabetes mellitus with hyperglycemia, with long-term current use of insulin (HCC)--on metformin and farxiga along with insulin glargine 10 units at hs  No episodes of hypoglycemia  Has dexcom and would like new rx for updated version (G7)       Lab Results   Component Value Date    HGBA1C 6 4 (H) 03/18/2023   urine microalbumin also completed on 3/18/23 and was normal    Foot exam due in august     • Major depressive disorder with single episode, in full remission (HCC)--feels that her stress levels are higher recently due to issues at work  She does not sleep well  Wakes up often throughout the night  Tired upon awakening does not snore  She wants to increase her effexor a little  PHQ-2/9 Depression Screening    Little interest or pleasure in doing things: 0 - not at all  Feeling down, depressed, or hopeless: 0 - not at all  Trouble falling or staying asleep, or sleeping too much: 2 - more than half the days  Feeling tired or having little energy: 2 - more than half the days  Poor appetite or overeatin - several days  Feeling bad about yourself - or that you are a failure or have let yourself or your family down: 0 - not at all  Trouble concentrating on things, such as reading the newspaper or watching television: 2 - more than half the days  Moving or speaking so slowly that other people could have noticed  Or the opposite - being so fidgety or restless that you have been moving around a lot more than usual: 1 - several days  Thoughts that you would be better off dead, or of hurting yourself in some way: 0 - not at all  PHQ-9 Score: 8   PHQ-9 Interpretation: Mild depression        ALIREZA-7 Flowsheet Screening    Flowsheet Row Most Recent Value   Over the last 2 weeks, how often have you been bothered by any of the following problems? Feeling nervous, anxious, or on edge 3   Not being able to stop or control worrying 1   Worrying too much about different things 1   Trouble relaxing 0   Being so restless that it is hard to sit still 3   Becoming easily annoyed or irritable 0   Feeling afraid as if something awful might happen 0   ALIREZA-7 Total Score 8           • Anxiety   • Irritable bowel syndrome (IBS)   • Hx of bilateral mastectomy   • Primary hypertension   • Mixed hyperlipidemia       Review of Systems    Past Medical History:   Diagnosis Date   • Anxiety    • Breast cancer ([facility] [address] )     right  bilateral mastectomy   Had chemo   • [facility] dx    • IBS (irritable bowel syndrome)    • Major depressive disorder with single episode, in full remission [facility])      Past Surgical History:   Procedure Laterality Date   • COMPLETE MASTECTOMY W/ SENTINEL NODE BIOPSY Bilateral 2008    expanders placed   • INCISION AND DRAINAGE (I&D) CHEST WALL      breast infection post chemo   • IR PORT REMOVAL AND PLACEMENT NEW SITE       Family History   Problem Relation Age of Onset   • No Known Problems Mother    • Alzheimer's disease Father    • Heart disease Maternal Grandmother    • Alzheimer's disease Paternal Grandmother    • Fibromyalgia Daughter    • Migraines Daughter    • Anemia Daughter    • No Known Problems Son      Social History     Socioeconomic History   • Marital status: /Civil Union     Spouse name: None   • Number of children: None   • Years of education: None   • Highest education level: None   Occupational History   • None   Tobacco Use   • Smoking status: Former     Packs/day: 0 50     Years: 25 00     Pack years: 12 50     Types: Cigarettes     Start date: 1976     Quit date:      Years since quittin 2   • Smokeless tobacco: Never   • Tobacco comments:     stopped 16 years ago   Vaping Use   • Vaping Use: Never used   Substance and Sexual Activity   • Alcohol use: Yes     Comment: 2 drinks per month   • Drug use: Never   • Sexual activity: Yes     Partners: Male   Other Topics Concern   • None   Social History Narrative        2 children age 36 and 35    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, [employer]     Social Determinants of Health     Financial Resource Strain: Not on file   Food Insecurity: Not on file   Transportation Needs: Not on file   Physical Activity: Not on file   Stress: Not on file   Social Connections: Not on file   Intimate Partner Violence: Not on file   Housing Stability: Not on file     Current Outpatient Medications on File Prior to Visit   Medication Sig   • "clotrimazole-betamethasone (LOTRISONE) 1-0 05 % cream Apply topically 2 (two) times a day   • Continuous Blood Gluc Transmit (Dexcom G6 Transmitter) MISC As directed   • [DISCONTINUED] atorvastatin (LIPITOR) 10 mg tablet Take 10 mg by mouth daily    • [DISCONTINUED] Blood Glucose Monitoring Suppl (Contour Next EZ) w/Device KIT    • [DISCONTINUED] Continuous Blood Gluc  (Dexcom G6 ) LENA Use 1 Units in the morning   • [DISCONTINUED] Continuous Blood Gluc Sensor (Dexcom G6 Sensor) MISC Use 1 Units in the morning   • [DISCONTINUED] Contour Next Test test strip    • [DISCONTINUED] Farxiga 10 MG tablet Take 1 tablet (10 mg total) by mouth daily   • [DISCONTINUED] Insulin Glargine-yfgn (Semglee, yfgn,) 100 UNIT/ML SOLN Inject 0 1 mL (10 Units total) as directed daily at bedtime   • [DISCONTINUED] lisinopril (ZESTRIL) 10 mg tablet Take 1 tablet (10 mg total) by mouth daily   • [DISCONTINUED] metFORMIN (GLUCOPHAGE) 500 mg tablet Take 1,000 mg by mouth 2 (two) times a day with meals    • [DISCONTINUED] Microlet Lancets MISC    • [DISCONTINUED] venlafaxine (EFFEXOR-XR) 150 mg 24 hr capsule Take 1 capsule (150 mg total) by mouth daily   • [DISCONTINUED] venlafaxine (EFFEXOR-XR) 75 mg 24 hr capsule Take 1 capsule (75 mg total) by mouth daily     Allergies   Allergen Reactions   • Nuts - Food Allergy Vomiting     Eyes can swell   • Sulfa Antibiotics GI Intolerance     Immunization History   Administered Date(s) Administered   • COVID-19 MODERNA VACC 0 5 ML IM 01/05/2021, 02/05/2021   • Pneumococcal Conjugate Vaccine 20-valent (Pcv20), Polysace 03/27/2023   • Tdap 07/08/2022       Objective     /60 (BP Location: Left arm, Patient Position: Sitting, Cuff Size: Large)   Pulse 87   Temp 99 3 °F (37 4 °C) (Tympanic)   Ht 5' 7\" (1 702 m)   Wt 75 kg (165 lb 6 4 oz)   SpO2 97%   BMI 25 91 kg/m²     Physical Exam  Vitals and nursing note reviewed     Constitutional:       General: She is not in acute " distress  Appearance: Normal appearance  She is not ill-appearing, toxic-appearing or diaphoretic  HENT:      Head: Normocephalic and atraumatic  Right Ear: Tympanic membrane normal       Left Ear: Tympanic membrane normal       Nose: Nose normal       Mouth/Throat:      Mouth: Mucous membranes are moist       Pharynx: No oropharyngeal exudate or posterior oropharyngeal erythema  Eyes:      Extraocular Movements: Extraocular movements intact  Conjunctiva/sclera: Conjunctivae normal       Pupils: Pupils are equal, round, and reactive to light  Neck:      Vascular: No carotid bruit  Comments: No thyromegaly  Cardiovascular:      Rate and Rhythm: Normal rate and regular rhythm  Heart sounds: No murmur heard  Pulmonary:      Effort: Pulmonary effort is normal       Breath sounds: Normal breath sounds  Abdominal:      General: Abdomen is flat  Bowel sounds are normal  There is no distension  Palpations: Abdomen is soft  There is no mass  Tenderness: There is no abdominal tenderness  Musculoskeletal:         General: Normal range of motion  Cervical back: Normal range of motion and neck supple  Right lower leg: No edema  Left lower leg: No edema  Lymphadenopathy:      Cervical: No cervical adenopathy  Skin:     General: Skin is warm and dry  Findings: No rash  Neurological:      General: No focal deficit present  Mental Status: She is alert and oriented to person, place, and time        Deep Tendon Reflexes: Reflexes normal    Psychiatric:         Mood and Affect: Mood normal        Kriss Langston DO

## 2023-03-27 NOTE — PROGRESS NOTES
Name: Luana Closs      : 1960      MRN: [id number]  Encounter Provider: Mandeep Lazaro DO  Encounter Date: 3/27/2023   Encounter department: [address] [place] [address]     1  Irritable bowel syndrome with both constipation and diarrhea    2  Type 2 diabetes mellitus with hyperglycemia, with long-term current use of insulin (HCC)    3  Primary hypertension    4  Major depressive disorder with single episode, in full remission ([facility] [address] )    5  Anxiety    6  Mixed hyperlipidemia         Subjective     HPI   Patient is a 57year old female with hypertension, hyperlipidemia DM2, IBS, anxiety and depression who is being seen today for her AMWV (see separate note) and for f/u on these chronic conditions and review of labs done 3/18/23  She is overdue for colon cancer screening    Cervical cancer screening is up to date (21)  Patient Active Problem List   Diagnosis   • History of breast cancer--s/p bilateral mastectomy   • Type 2 diabetes mellitus with hyperglycemia, with long-term current use of insulin (HCC)--on metformin and farxiga     Lab Results   Component Value Date    HGBA1C 6 4 (H) 2023   urine microalbumin also completed on 3/18/23 and was normal    Foot exam due in august     • Major depressive disorder with single episode, in full remission ([facility] [address] )  PHQ-2/9 Depression Screening    Little interest or pleasure in doing things: 0 - not at all  Feeling down, depressed, or hopeless: 0 - not at all  Trouble falling or staying asleep, or sleeping too much: 2 - more than half the days  Feeling tired or having little energy: 2 - more than half the days  Poor appetite or overeatin - several days  Feeling bad about yourself - or that you are a failure or have let yourself or your family down: 0 - not at all  Trouble concentrating on things, such as reading the newspaper or watching television: 2 - more than half the days  Moving or speaking so slowly that other people could have noticed  Or the opposite - being so fidgety or restless that you have been moving around a lot more than usual: 1 - several days  Thoughts that you would be better off dead, or of hurting yourself in some way: 0 - not at all  PHQ-9 Score: 8   PHQ-9 Interpretation: Mild depression        ALRIEZA-7 Flowsheet Screening    Flowsheet Row Most Recent Value   Over the last 2 weeks, how often have you been bothered by any of the following problems? Feeling nervous, anxious, or on edge 3   Not being able to stop or control worrying 1   Worrying too much about different things 1   Trouble relaxing 0   Being so restless that it is hard to sit still 3   Becoming easily annoyed or irritable 0   Feeling afraid as if something awful might happen 0   ALIREZA-7 Total Score 8           • Anxiety   • Irritable bowel syndrome (IBS)   • Hx of bilateral mastectomy   • Primary hypertension   • Mixed hyperlipidemia       Review of Systems    Past Medical History:   Diagnosis Date   • Anxiety    • Breast cancer ([facility] [address] ) 2008    right  bilateral mastectomy   Had chemo   • Diabetes mellitus ([facility] [address] )     dx 2010   • IBS (irritable bowel syndrome)    • Major depressive disorder with single episode, in full remission [facility])      Past Surgical History:   Procedure Laterality Date   • COMPLETE MASTECTOMY W/ SENTINEL NODE BIOPSY Bilateral 2008    expanders placed   • INCISION AND DRAINAGE (I&D) CHEST WALL      breast infection post chemo   • IR PORT REMOVAL AND PLACEMENT NEW SITE       Family History   Problem Relation Age of Onset   • No Known Problems Mother    • Alzheimer's disease Father    • Heart disease Maternal Grandmother    • Alzheimer's disease Paternal Grandmother    • Fibromyalgia Daughter    • Migraines Daughter    • Anemia Daughter    • No Known Problems Son      Social History     Socioeconomic History   • Marital status: /Civil Union     Spouse name: None   • Number of children: None   • Years of education: None • Highest education level: None   Occupational History   • None   Tobacco Use   • Smoking status: Former     Packs/day: 0 50     Years: 25 00     Pack years: 12 50     Types: Cigarettes     Start date: 1976     Quit date:      Years since quittin 2   • Smokeless tobacco: Never   • Tobacco comments:     stopped 16 years ago   Vaping Use   • Vaping Use: Never used   Substance and Sexual Activity   • Alcohol use: Yes     Comment: 2 drinks per month   • Drug use: Never   • Sexual activity: Yes     Partners: Male   Other Topics Concern   • None   Social History Narrative        2 children age 36 and 35    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, [employer]     Social Determinants of Health     Financial Resource Strain: Not on file   Food Insecurity: Not on file   Transportation Needs: Not on file   Physical Activity: Not on file   Stress: Not on file   Social Connections: Not on file   Intimate Partner Violence: Not on file   Housing Stability: Not on file     Current Outpatient Medications on File Prior to Visit   Medication Sig   • atorvastatin (LIPITOR) 10 mg tablet Take 10 mg by mouth daily    • Blood Glucose Monitoring Suppl (Contour Next EZ) w/Device KIT    • clotrimazole-betamethasone (LOTRISONE) 1-0 05 % cream Apply topically 2 (two) times a day   • Continuous Blood Gluc  (Dexcom G6 ) LENA Use 1 Units in the morning   • Continuous Blood Gluc Sensor (Dexcom G6 Sensor) MISC Use 1 Units in the morning   • Continuous Blood Gluc Transmit (Dexcom G6 Transmitter) MISC As directed   • Contour Next Test test strip    • Farxiga 10 MG tablet Take 1 tablet (10 mg total) by mouth daily   • Insulin Glargine-yfgn (Semglee, yfgn,) 100 UNIT/ML SOLN Inject 0 1 mL (10 Units total) as directed daily at bedtime   • lisinopril (ZESTRIL) 10 mg tablet Take 1 tablet (10 mg total) by mouth daily   • metFORMIN (GLUCOPHAGE) 500 mg tablet Take 1,000 mg by mouth 2 (two) "times a day with meals    • Microlet Lancets MISC    • venlafaxine (EFFEXOR-XR) 150 mg 24 hr capsule Take 1 capsule (150 mg total) by mouth daily   • venlafaxine (EFFEXOR-XR) 75 mg 24 hr capsule Take 1 capsule (75 mg total) by mouth daily     Allergies   Allergen Reactions   • Nuts - Food Allergy Vomiting     Eyes can swell   • Sulfa Antibiotics GI Intolerance     Immunization History   Administered Date(s) Administered   • COVID-19 MODERNA VACC 0 5 ML IM 01/05/2021, 02/05/2021   • Tdap 07/08/2022       Objective     /60 (BP Location: Left arm, Patient Position: Sitting, Cuff Size: Large)   Pulse 87   Temp 99 3 °F (37 4 °C) (Tympanic)   Ht 5' 7\" (1 702 m)   Wt 75 kg (165 lb 6 4 oz)   SpO2 97%   BMI 25 91 kg/m²     Physical Exam  Shona Dhillon DO  "

## 2023-03-29 ENCOUNTER — TELEPHONE (OUTPATIENT)
Dept: FAMILY MEDICINE CLINIC | Facility: CLINIC | Age: 63
End: 2023-03-29

## 2023-03-29 NOTE — TELEPHONE ENCOUNTER
Prior Authorization Request -    Dexcom G7    Received PA Request for [facility] G7       [place]ma[place] #Torinl Rankin    PA Request and progress notes faxed to [employer] @ [phone]    Status: Pending

## 2023-04-04 NOTE — TELEPHONE ENCOUNTER
Prior Authorization Request - Denied    Rx: Dexcom G7     Status: Denied    Reason: Administrative Moratorium on [employer]  (6 months)    I called [employer] to verify the denial   [employer] is denied and cannot be appealed at this time, since the moratorium is for administrative purposes  I spoke with the patient and she is aware

## 2023-06-07 DIAGNOSIS — Z79.4 TYPE 2 DIABETES MELLITUS WITH HYPERGLYCEMIA, WITH LONG-TERM CURRENT USE OF INSULIN (HCC): ICD-10-CM

## 2023-06-07 DIAGNOSIS — E11.65 TYPE 2 DIABETES MELLITUS WITH HYPERGLYCEMIA, WITH LONG-TERM CURRENT USE OF INSULIN (HCC): ICD-10-CM

## 2023-06-08 RX ORDER — PROCHLORPERAZINE 25 MG/1
SUPPOSITORY RECTAL
Qty: 1 EACH | Refills: 0 | Status: SHIPPED | OUTPATIENT
Start: 2023-06-08

## 2023-07-11 ENCOUNTER — TELEPHONE (OUTPATIENT)
Dept: FAMILY MEDICINE CLINIC | Facility: CLINIC | Age: 63
End: 2023-07-11

## 2023-07-11 NOTE — TELEPHONE ENCOUNTER
Prior Authorization -    Received faxed form from [employer] for [facility] sensor. Spoke with Pharmacist at [facility] and received confirmation that the patient filled rx successfully. Summary: PA not needed at this time.

## 2023-09-12 LAB
LEFT EYE DIABETIC RETINOPATHY: NORMAL
RIGHT EYE DIABETIC RETINOPATHY: NORMAL

## 2023-09-25 ENCOUNTER — TELEPHONE (OUTPATIENT)
Dept: FAMILY MEDICINE CLINIC | Facility: CLINIC | Age: 63
End: 2023-09-25

## 2023-09-25 NOTE — TELEPHONE ENCOUNTER
Appointment  (No Show) -     Patient missed appointment for 6 mos diabetic check- up on 9/25/2023. LVM advising patient to call the office to reschedule.

## 2023-10-15 DIAGNOSIS — E11.9 TYPE 2 DIABETES MELLITUS WITHOUT COMPLICATION, WITHOUT LONG-TERM CURRENT USE OF INSULIN (HCC): ICD-10-CM

## 2023-10-16 RX ORDER — DAPAGLIFLOZIN 10 MG/1
10 TABLET, FILM COATED ORAL DAILY
Qty: 90 TABLET | Refills: 0 | Status: SHIPPED | OUTPATIENT
Start: 2023-10-16

## 2023-10-16 NOTE — TELEPHONE ENCOUNTER
Patient is aware. Patient is aware that Dr. April Fam refilled Brenda Sin and to complete labs ahead of 10/24/23 OV.

## 2023-10-16 NOTE — TELEPHONE ENCOUNTER
Patient has appt scheduled for 10/24/23. She is overdue for labs. Please call her to remind to get done prior to visit. I refilled her farxiga.

## 2023-10-27 ENCOUNTER — APPOINTMENT (OUTPATIENT)
Dept: LAB | Facility: HOSPITAL | Age: 63
End: 2023-10-27
Payer: COMMERCIAL

## 2023-10-27 DIAGNOSIS — E78.2 MIXED HYPERLIPIDEMIA: ICD-10-CM

## 2023-10-27 DIAGNOSIS — Z79.4 TYPE 2 DIABETES MELLITUS WITH HYPERGLYCEMIA, WITH LONG-TERM CURRENT USE OF INSULIN (HCC): ICD-10-CM

## 2023-10-27 DIAGNOSIS — E11.65 TYPE 2 DIABETES MELLITUS WITH HYPERGLYCEMIA, WITH LONG-TERM CURRENT USE OF INSULIN (HCC): ICD-10-CM

## 2023-10-27 LAB
ALBUMIN SERPL BCP-MCNC: 4.3 G/DL (ref 3.5–5)
ALP SERPL-CCNC: 92 U/L (ref 34–104)
ALT SERPL W P-5'-P-CCNC: 16 U/L (ref 7–52)
ANION GAP SERPL CALCULATED.3IONS-SCNC: 8 MMOL/L
AST SERPL W P-5'-P-CCNC: 17 U/L (ref 13–39)
BILIRUB SERPL-MCNC: 0.31 MG/DL (ref 0.2–1)
BUN SERPL-MCNC: 9 MG/DL (ref 5–25)
CALCIUM SERPL-MCNC: 9.7 MG/DL (ref 8.4–10.2)
CHLORIDE SERPL-SCNC: 104 MMOL/L (ref 96–108)
CHOLEST SERPL-MCNC: 178 MG/DL
CO2 SERPL-SCNC: 30 MMOL/L (ref 21–32)
CREAT SERPL-MCNC: 0.83 MG/DL (ref 0.6–1.3)
EST. AVERAGE GLUCOSE BLD GHB EST-MCNC: 171 MG/DL
GFR SERPL CREATININE-BSD FRML MDRD: 74 ML/MIN/1.73SQ M
GLUCOSE P FAST SERPL-MCNC: 173 MG/DL (ref 65–99)
HBA1C MFR BLD: 7.6 %
HDLC SERPL-MCNC: 43 MG/DL
LDLC SERPL CALC-MCNC: 100 MG/DL (ref 0–100)
NONHDLC SERPL-MCNC: 135 MG/DL
POTASSIUM SERPL-SCNC: 4.5 MMOL/L (ref 3.5–5.3)
PROT SERPL-MCNC: 7.3 G/DL (ref 6.4–8.4)
SODIUM SERPL-SCNC: 142 MMOL/L (ref 135–147)
TRIGL SERPL-MCNC: 174 MG/DL

## 2023-10-27 PROCEDURE — 83036 HEMOGLOBIN GLYCOSYLATED A1C: CPT

## 2023-10-27 PROCEDURE — 36415 COLL VENOUS BLD VENIPUNCTURE: CPT

## 2023-10-27 PROCEDURE — 80053 COMPREHEN METABOLIC PANEL: CPT

## 2023-10-27 PROCEDURE — 80061 LIPID PANEL: CPT

## 2023-11-02 NOTE — PROGRESS NOTES
Name: Daniel Hess      : 1960      MRN: [id number]  Encounter Provider: Erin Gill DO  Encounter Date: 11/3/2023   Encounter department: [address] [place] [address]     1. Primary hypertension  Bp at goal.   Continue lisinopril 10 mg once daily  Reviewed CMP  2. Encounter for immunization  -     influenza vaccine, quadrivalent, 0.5 mL, preservative-free, for adult and pediatric patients 6 mos+ (AFLURIA, FLUARIX, FLULAVAL, FLUZONE)    3. Mixed hyperlipidemia  -     Lipid Panel with Direct LDL reflex; Future; Expected date: 2024  Lab Results   Component Value Date    CHOLESTEROL 178 10/27/2023    CHOLESTEROL 164 2023    CHOLESTEROL 190 2022     Lab Results   Component Value Date    HDL 43 (L) 10/27/2023    HDL 45 (L) 2023    HDL 42 (L) 2022     Lab Results   Component Value Date    TRIG 174 (H) 10/27/2023    TRIG 132 2023    TRIG 212 (H) 2022     Lab Results   Component Value Date    [address] 135 10/27/2023    [address] 119 2023    [address] 131 2021     LDL is at goal. On atorvastatin 10 and tolerating well. 4. Type 2 diabetes mellitus with hyperglycemia, with long-term current use of insulin (HCC)  -     Albumin / creatinine urine ratio; Future; Expected date: 2024  -     Comprehensive metabolic panel; Future; Expected date: 2024  -     Hemoglobin A1C; Future; Expected date: 2024  -     insulin glargine (Semglee) 100 units/mL subcutaneous injection; Inject 10 Units under the skin daily at bedtime  -     Continuous Blood Gluc  (Dexcom G7 ) LENA; Use 1 Units in the morning  -     Continuous Blood Gluc Sensor (Dexcom G7 Sensor); Use 1 Device in the morning  -     Continuous Blood Gluc Transmit (Dexcom G6 Transmitter) MISC; USE AS DIRECTED CHANGE EVERY 90 DAYS  Her A1c went up from 6.4 in march to 7.6 on recent labs with cessation of her basal insulin.    Will resume basal insulin at 10 units at hs. Continue the metformin and farxiga. Recheck labs in 6 months followed by appt  Foot exam completed today  Eye exam up to date  I also refilled her dexcom   5. Irritable bowel syndrome with both constipation and diarrhea    6. History of breast cancer    7. Hx of bilateral mastectomy    8. Anxiety  Stable. Doing well. 9. Major depressive disorder with single episode, in full remission ([address])  Stable and doing well on effexor xr 150 mg once daily  10. Type 2 diabetes mellitus without complication, without long-term current use of insulin (HCC)  -     clotrimazole-betamethasone (LOTRISONE) 1-0.05 % cream; Apply topically 2 (two) times a day    11. BMI 25.0-25.9,adult      BMI Counseling: Body mass index is 25.4 kg/m². The BMI is above normal. Nutrition recommendations include decreasing portion sizes, encouraging healthy choices of fruits and vegetables and moderation in carbohydrate intake. Exercise recommendations include moderate physical activity 150 minutes/week. No pharmacotherapy was ordered. Rationale for BMI follow-up plan is due to patient being overweight or obese. Subjective     HPI  Patient is a 57year old female with hypertension, hyperlipidemia, DM2, anxiety, depression, IBS and history of breast cancer s/p bilateral mastectomy who is being seen today for routine f/u visit and review of labs completed on 10/27/23. Her A1c was up to 7.6 from 6.4 in March. She states that there have been no changes in diet or activity level. The only thing different is that the insulin was d/c'd when I saw her last.   No excess thirst, frequent urination. No numbness or tingling in feet. She is overdue for colon cancer screening. Has cologuard at home. She is planning to retire in February. Looking forward to it. Does not plan to get this season's covid shot. Is agreeable to flu vaccine and would consider RSV vaccine. Moods are good.      ALIREZA-7 Flowsheet Screening    Flowsheet Row Most Recent Value   Over the last 2 weeks, how often have you been bothered by any of the following problems? Feeling nervous, anxious, or on edge 1   Not being able to stop or control worrying 0   Trouble relaxing 0   Being so restless that it is hard to sit still 0   Becoming easily annoyed or irritable 0   Feeling afraid as if something awful might happen 0        PHQ-2/9 Depression Screening    Little interest or pleasure in doing things: 0 - not at all  Feeling down, depressed, or hopeless: 0 - not at all  Trouble falling or staying asleep, or sleeping too much: 0 - not at all  Feeling tired or having little energy: 0 - not at all  Poor appetite or overeatin - not at all  Feeling bad about yourself - or that you are a failure or have let yourself or your family down: 0 - not at all  Trouble concentrating on things, such as reading the newspaper or watching television: 0 - not at all  Moving or speaking so slowly that other people could have noticed. Or the opposite - being so fidgety or restless that you have been moving around a lot more than usual: 0 - not at all  Thoughts that you would be better off dead, or of hurting yourself in some way: 0 - not at all  PHQ-9 Score: 0   PHQ-9 Interpretation: No or Minimal depression            Review of Systems    Past Medical History:   Diagnosis Date   • Anxiety    • Breast cancer ([address])     right. bilateral mastectomy.  Had chemo   • Diabetes mellitus ([address])     dx    • IBS (irritable bowel syndrome)    • Major depressive disorder with single episode, in full remission [facility])      Past Surgical History:   Procedure Laterality Date   • COMPLETE MASTECTOMY W/ SENTINEL NODE BIOPSY Bilateral 2008    expanders placed   • INCISION AND DRAINAGE (I&D) CHEST WALL      breast infection post chemo   • IR PORT REMOVAL AND PLACEMENT NEW SITE       Family History   Problem Relation Age of Onset   • No Known Problems Mother    • Alzheimer's disease Father    • Heart disease Maternal Grandmother    • Alzheimer's disease Paternal Grandmother    • Fibromyalgia Daughter    • Migraines Daughter    • Anemia Daughter    • No Known Problems Son      Social History     Socioeconomic History   • Marital status: /Civil Union     Spouse name: None   • Number of children: None   • Years of education: None   • Highest education level: None   Occupational History   • None   Tobacco Use   • Smoking status: Former     Packs/day: 0.50     Years: 25.00     Total pack years: 12.50     Types: Cigarettes     Start date: 1976     Quit date:      Years since quittin.8   • Smokeless tobacco: Never   • Tobacco comments:     stopped 16 years ago   Vaping Use   • Vaping Use: Never used   Substance and Sexual Activity   • Alcohol use: Yes     Comment: 2 drinks per month   • Drug use: Never   • Sexual activity: Yes     Partners: Male   Other Topics Concern   • None   Social History Narrative        2 children age 36 and 35    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, [employer]     Social Determinants of Health     Financial Resource Strain: Not on file   Food Insecurity: Not on file   Transportation Needs: Not on file   Physical Activity: Not on file   Stress: Not on file   Social Connections: Not on file   Intimate Partner Violence: Not on file   Housing Stability: Not on file     Current Outpatient Medications on File Prior to Visit   Medication Sig   • atorvastatin (LIPITOR) 10 mg tablet Take 1 tablet (10 mg total) by mouth daily   • Farxiga 10 MG tablet Take 1 tablet (10 mg total) by mouth daily   • lisinopril (ZESTRIL) 10 mg tablet Take 1 tablet (10 mg total) by mouth daily   • metFORMIN (GLUCOPHAGE) 500 mg tablet Take 2 tablets (1,000 mg total) by mouth 2 (two) times a day with meals   • venlafaxine (EFFEXOR-XR) 150 mg 24 hr capsule Take 2 capsules (300 mg total) by mouth daily   • [DISCONTINUED] clotrimazole-betamethasone (LOTRISONE) 1-0.05 % cream Apply topically 2 (two) times a day   • [DISCONTINUED] Continuous Blood Gluc  (Dexcom G7 ) LENA Use 1 Units in the morning (Patient not taking: Reported on 11/3/2023)   • [DISCONTINUED] Continuous Blood Gluc Sensor (Dexcom G7 Sensor) MISC Use 1 Units in the morning (Patient not taking: Reported on 11/3/2023)   • [DISCONTINUED] Continuous Blood Gluc Transmit (Dexcom G6 Transmitter) MISC USE AS DIRECTED CHANGE EVERY 90 DAYS (Patient not taking: Reported on 11/3/2023)     Allergies   Allergen Reactions   • Nuts - Food Allergy Vomiting     Eyes can swell   • Sulfa Antibiotics GI Intolerance     Immunization History   Administered Date(s) Administered   • COVID-19 MODERNA VACC 0.5 ML IM 01/05/2021, 02/05/2021   • Pneumococcal Conjugate Vaccine 20-valent (Pcv20), Polysace 03/27/2023   • Tdap 07/08/2022       Objective     /68   Pulse 83   Temp (!) 95.9 °F (35.5 °C) (Tympanic)   Resp 18   Ht 5' 7.5" (1.715 m)   Wt 74.7 kg (164 lb 9.6 oz)   SpO2 99%   BMI 25.40 kg/m²   Patient's shoes and socks removed. Right Foot/Ankle   Right Foot Inspection  Skin Exam: skin normal and skin intact. No dry skin, no warmth, no callus, no erythema, no maceration, no abnormal color, no pre-ulcer, no ulcer and no callus. Toe Exam: ROM and strength within normal limits. Vascular  Capillary refills: < 3 seconds  The right DP pulse is 2+. The right PT pulse is 2+. Left Foot/Ankle  Left Foot Inspection  Skin Exam: skin normal and skin intact. No dry skin, no warmth, no erythema, no maceration, normal color, no pre-ulcer, no ulcer and no callus. Toe Exam: ROM and strength within normal limits. Vascular  Capillary refills: < 3 seconds  The left DP pulse is 2+. The left PT pulse is 2+. Assign Risk Category  No deformity present  No loss of protective sensation  No weak pulses  Risk: 0      Physical Exam  Vitals and nursing note reviewed.    Constitutional:       General: She is not in acute distress. Appearance: Normal appearance. She is not ill-appearing, toxic-appearing or diaphoretic. HENT:      Head: Normocephalic and atraumatic. Right Ear: Tympanic membrane normal.      Left Ear: Tympanic membrane normal.      Nose: Nose normal.      Mouth/Throat:      Mouth: Mucous membranes are moist.      Pharynx: No oropharyngeal exudate or posterior oropharyngeal erythema. Eyes:      Extraocular Movements: Extraocular movements intact. Conjunctiva/sclera: Conjunctivae normal.      Pupils: Pupils are equal, round, and reactive to light. Cardiovascular:      Rate and Rhythm: Normal rate and regular rhythm. Pulses: Normal pulses. no weak pulses          Dorsalis pedis pulses are 2+ on the right side and 2+ on the left side. Posterior tibial pulses are 2+ on the right side and 2+ on the left side. Heart sounds: No murmur heard. Pulmonary:      Effort: Pulmonary effort is normal.      Breath sounds: Normal breath sounds. Abdominal:      General: Abdomen is flat. Bowel sounds are normal.      Palpations: Abdomen is soft. Musculoskeletal:      Cervical back: Normal range of motion and neck supple. Right lower leg: No edema. Left lower leg: No edema. Feet:      Right foot:      Skin integrity: No ulcer, skin breakdown, erythema, warmth, callus or dry skin. Left foot:      Skin integrity: No ulcer, skin breakdown, erythema, warmth, callus or dry skin. Lymphadenopathy:      Cervical: No cervical adenopathy. Skin:     General: Skin is warm and dry. Findings: No rash. Neurological:      General: No focal deficit present. Mental Status: She is alert and oriented to person, place, and time.    Psychiatric:         Mood and Affect: Mood normal.   Arabella Wylie DO

## 2023-11-03 ENCOUNTER — OFFICE VISIT (OUTPATIENT)
Dept: FAMILY MEDICINE CLINIC | Facility: CLINIC | Age: 63
End: 2023-11-03
Payer: COMMERCIAL

## 2023-11-03 VITALS
RESPIRATION RATE: 18 BRPM | DIASTOLIC BLOOD PRESSURE: 68 MMHG | TEMPERATURE: 95.9 F | HEIGHT: 68 IN | HEART RATE: 83 BPM | SYSTOLIC BLOOD PRESSURE: 120 MMHG | WEIGHT: 164.6 LBS | BODY MASS INDEX: 24.95 KG/M2 | OXYGEN SATURATION: 99 %

## 2023-11-03 DIAGNOSIS — F32.5 MAJOR DEPRESSIVE DISORDER WITH SINGLE EPISODE, IN FULL REMISSION (HCC): ICD-10-CM

## 2023-11-03 DIAGNOSIS — Z90.13 HX OF BILATERAL MASTECTOMY: ICD-10-CM

## 2023-11-03 DIAGNOSIS — Z85.3 HISTORY OF BREAST CANCER: ICD-10-CM

## 2023-11-03 DIAGNOSIS — E78.2 MIXED HYPERLIPIDEMIA: ICD-10-CM

## 2023-11-03 DIAGNOSIS — Z79.4 TYPE 2 DIABETES MELLITUS WITH HYPERGLYCEMIA, WITH LONG-TERM CURRENT USE OF INSULIN (HCC): ICD-10-CM

## 2023-11-03 DIAGNOSIS — E11.65 TYPE 2 DIABETES MELLITUS WITH HYPERGLYCEMIA, WITH LONG-TERM CURRENT USE OF INSULIN (HCC): ICD-10-CM

## 2023-11-03 DIAGNOSIS — I10 PRIMARY HYPERTENSION: Primary | ICD-10-CM

## 2023-11-03 DIAGNOSIS — E11.9 TYPE 2 DIABETES MELLITUS WITHOUT COMPLICATION, WITHOUT LONG-TERM CURRENT USE OF INSULIN (HCC): ICD-10-CM

## 2023-11-03 DIAGNOSIS — Z23 ENCOUNTER FOR IMMUNIZATION: ICD-10-CM

## 2023-11-03 DIAGNOSIS — F41.9 ANXIETY: ICD-10-CM

## 2023-11-03 DIAGNOSIS — K58.2 IRRITABLE BOWEL SYNDROME WITH BOTH CONSTIPATION AND DIARRHEA: ICD-10-CM

## 2023-11-03 PROCEDURE — 90686 IIV4 VACC NO PRSV 0.5 ML IM: CPT

## 2023-11-03 PROCEDURE — 99214 OFFICE O/P EST MOD 30 MIN: CPT | Performed by: FAMILY MEDICINE

## 2023-11-03 PROCEDURE — 90471 IMMUNIZATION ADMIN: CPT

## 2023-11-03 RX ORDER — ACYCLOVIR 400 MG/1
1 TABLET ORAL DAILY
Qty: 1 EACH | Refills: 1 | Status: SHIPPED | OUTPATIENT
Start: 2023-11-03

## 2023-11-03 RX ORDER — PROCHLORPERAZINE 25 MG/1
SUPPOSITORY RECTAL
Qty: 1 EACH | Refills: 0 | Status: SHIPPED | OUTPATIENT
Start: 2023-11-03

## 2023-11-03 RX ORDER — ACYCLOVIR 400 MG/1
1 TABLET ORAL DAILY
Qty: 1 EACH | Refills: 0 | Status: SHIPPED | OUTPATIENT
Start: 2023-11-03 | End: 2023-11-06

## 2023-11-03 RX ORDER — INSULIN GLARGINE 100 [IU]/ML
10 INJECTION, SOLUTION SUBCUTANEOUS
Qty: 10 ML | Refills: 1 | Status: SHIPPED | OUTPATIENT
Start: 2023-11-03

## 2023-11-03 RX ORDER — CLOTRIMAZOLE AND BETAMETHASONE DIPROPIONATE 10; .64 MG/G; MG/G
CREAM TOPICAL 2 TIMES DAILY
Qty: 30 G | Refills: 0 | Status: SHIPPED | OUTPATIENT
Start: 2023-11-03

## 2023-11-06 RX ORDER — ACYCLOVIR 400 MG/1
1 TABLET ORAL DAILY
Qty: 9 EACH | Refills: 0 | Status: SHIPPED | OUTPATIENT
Start: 2023-11-06

## 2023-12-04 DIAGNOSIS — F32.5 MAJOR DEPRESSIVE DISORDER WITH SINGLE EPISODE, IN FULL REMISSION (HCC): ICD-10-CM

## 2023-12-04 DIAGNOSIS — F41.9 ANXIETY: ICD-10-CM

## 2023-12-04 RX ORDER — VENLAFAXINE HYDROCHLORIDE 150 MG/1
300 CAPSULE, EXTENDED RELEASE ORAL DAILY
Qty: 180 CAPSULE | Refills: 0 | Status: SHIPPED | OUTPATIENT
Start: 2023-12-04

## 2023-12-05 DIAGNOSIS — Z00.6 ENCOUNTER FOR EXAMINATION FOR NORMAL COMPARISON OR CONTROL IN CLINICAL RESEARCH PROGRAM: ICD-10-CM

## 2023-12-13 DIAGNOSIS — F32.5 MAJOR DEPRESSIVE DISORDER WITH SINGLE EPISODE, IN FULL REMISSION (HCC): ICD-10-CM

## 2023-12-13 DIAGNOSIS — F41.9 ANXIETY: ICD-10-CM

## 2023-12-13 DIAGNOSIS — E11.9 TYPE 2 DIABETES MELLITUS WITHOUT COMPLICATION, WITHOUT LONG-TERM CURRENT USE OF INSULIN (HCC): ICD-10-CM

## 2023-12-13 RX ORDER — VENLAFAXINE HYDROCHLORIDE 150 MG/1
300 CAPSULE, EXTENDED RELEASE ORAL DAILY
Qty: 60 CAPSULE | Refills: 0 | Status: SHIPPED | OUTPATIENT
Start: 2023-12-13

## 2023-12-13 NOTE — TELEPHONE ENCOUNTER
Med refill left on rx line    Request 30 day supplies for Effexor and metformin (she will be paying out of pocket)     [facility]   [phone]    Medication sent in as requested by patient, left detailed voice mail.

## 2023-12-22 ENCOUNTER — APPOINTMENT (OUTPATIENT)
Dept: LAB | Facility: HOSPITAL | Age: 63
End: 2023-12-22

## 2023-12-22 DIAGNOSIS — Z00.6 ENCOUNTER FOR EXAMINATION FOR NORMAL COMPARISON OR CONTROL IN CLINICAL RESEARCH PROGRAM: ICD-10-CM

## 2023-12-22 PROCEDURE — 36415 COLL VENOUS BLD VENIPUNCTURE: CPT

## 2024-01-10 DIAGNOSIS — F32.5 MAJOR DEPRESSIVE DISORDER WITH SINGLE EPISODE, IN FULL REMISSION (HCC): ICD-10-CM

## 2024-01-10 DIAGNOSIS — F41.9 ANXIETY: ICD-10-CM

## 2024-01-10 RX ORDER — VENLAFAXINE HYDROCHLORIDE 150 MG/1
300 CAPSULE, EXTENDED RELEASE ORAL DAILY
Qty: 60 CAPSULE | Refills: 0 | Status: SHIPPED | OUTPATIENT
Start: 2024-01-10

## 2024-01-18 LAB
APOB+LDLR+PCSK9 GENE MUT ANL BLD/T: NOT DETECTED
BRCA1+BRCA2 DEL+DUP + FULL MUT ANL BLD/T: NOT DETECTED
MLH1+MSH2+MSH6+PMS2 GN DEL+DUP+FUL M: NOT DETECTED

## 2024-05-03 ENCOUNTER — TELEPHONE (OUTPATIENT)
Dept: FAMILY MEDICINE CLINIC | Facility: CLINIC | Age: 64
End: 2024-05-03

## 2024-06-01 ENCOUNTER — NURSE TRIAGE (OUTPATIENT)
Dept: OTHER | Facility: OTHER | Age: 64
End: 2024-06-01

## 2024-06-01 NOTE — TELEPHONE ENCOUNTER
"Regarding: med refill  ----- Message from Chantel WORKMAN sent at 6/1/2024 10:29 AM EDT -----  \"My mom needs a refill on her Dexcom. Can we get one sent over.\"    "

## 2024-07-01 ENCOUNTER — OFFICE VISIT (OUTPATIENT)
Dept: FAMILY MEDICINE CLINIC | Facility: CLINIC | Age: 64
End: 2024-07-01
Payer: COMMERCIAL

## 2024-07-01 VITALS
HEART RATE: 114 BPM | TEMPERATURE: 98.9 F | HEIGHT: 68 IN | OXYGEN SATURATION: 98 % | WEIGHT: 169 LBS | BODY MASS INDEX: 25.61 KG/M2 | DIASTOLIC BLOOD PRESSURE: 80 MMHG | SYSTOLIC BLOOD PRESSURE: 124 MMHG

## 2024-07-01 DIAGNOSIS — J38.7 MASS OF VALLECULA: Primary | ICD-10-CM

## 2024-07-01 DIAGNOSIS — E11.65 TYPE 2 DIABETES MELLITUS WITH HYPERGLYCEMIA, WITH LONG-TERM CURRENT USE OF INSULIN (HCC): ICD-10-CM

## 2024-07-01 DIAGNOSIS — F32.5 MAJOR DEPRESSIVE DISORDER WITH SINGLE EPISODE, IN FULL REMISSION (HCC): ICD-10-CM

## 2024-07-01 DIAGNOSIS — S06.2X9S: ICD-10-CM

## 2024-07-01 DIAGNOSIS — Z79.4 TYPE 2 DIABETES MELLITUS WITH HYPERGLYCEMIA, WITH LONG-TERM CURRENT USE OF INSULIN (HCC): ICD-10-CM

## 2024-07-01 DIAGNOSIS — R26.81 UNSTEADY GAIT WHEN WALKING: ICD-10-CM

## 2024-07-01 PROBLEM — S06.2XAA: Status: ACTIVE | Noted: 2024-05-20

## 2024-07-01 PROBLEM — F32.9 MDD (MAJOR DEPRESSIVE DISORDER): Status: ACTIVE | Noted: 2024-05-21

## 2024-07-01 PROBLEM — I62.00 SUBDURAL HEMORRHAGE (HCC): Status: ACTIVE | Noted: 2024-05-12

## 2024-07-01 PROCEDURE — 99214 OFFICE O/P EST MOD 30 MIN: CPT | Performed by: FAMILY MEDICINE

## 2024-07-01 PROCEDURE — G2211 COMPLEX E/M VISIT ADD ON: HCPCS | Performed by: FAMILY MEDICINE

## 2024-07-01 RX ORDER — PEN NEEDLE, DIABETIC 32GX 5/32"
NEEDLE, DISPOSABLE MISCELLANEOUS 2 TIMES DAILY
Qty: 100 EACH | Refills: 1 | Status: SHIPPED | OUTPATIENT
Start: 2024-07-01

## 2024-07-01 RX ORDER — FAMOTIDINE 20 MG/1
20 TABLET, FILM COATED ORAL DAILY
COMMUNITY
Start: 2024-05-31

## 2024-07-01 RX ORDER — INSULIN LISPRO 100 [IU]/ML
45 INJECTION, SUSPENSION SUBCUTANEOUS 2 TIMES DAILY WITH MEALS
COMMUNITY
Start: 2024-05-31

## 2024-07-01 RX ORDER — LANCETS
EACH MISCELLANEOUS
Status: CANCELLED | OUTPATIENT
Start: 2024-07-01

## 2024-07-01 RX ORDER — LANCETS
EACH MISCELLANEOUS
COMMUNITY
Start: 2024-06-04

## 2024-07-01 RX ORDER — ACYCLOVIR 400 MG/1
1 TABLET ORAL DAILY
Qty: 1 EACH | Refills: 1 | Status: SHIPPED | OUTPATIENT
Start: 2024-07-01

## 2024-07-01 RX ORDER — PEN NEEDLE, DIABETIC 32GX 5/32"
NEEDLE, DISPOSABLE MISCELLANEOUS
COMMUNITY
Start: 2024-05-31 | End: 2024-07-01 | Stop reason: SDUPTHER

## 2024-07-01 RX ORDER — QUETIAPINE FUMARATE 25 MG/1
25 TABLET, FILM COATED ORAL
COMMUNITY
Start: 2024-05-31

## 2024-07-01 RX ORDER — ACYCLOVIR 400 MG/1
1 TABLET ORAL DAILY
Qty: 9 EACH | Refills: 0 | Status: SHIPPED | OUTPATIENT
Start: 2024-07-01

## 2024-07-01 RX ORDER — BLOOD SUGAR DIAGNOSTIC
STRIP MISCELLANEOUS
COMMUNITY
Start: 2024-05-31

## 2024-07-01 RX ORDER — BLOOD-GLUCOSE METER
EACH MISCELLANEOUS
COMMUNITY
Start: 2024-05-31

## 2024-07-01 NOTE — PROGRESS NOTES
Ambulatory Visit  Name: Lynette Leyva      : 1960      MRN: [id number]  Encounter Provider: Keya Chan DO  Encounter Date: 2024   Encounter department: [facility]    Assessment & Plan   1. Mass of vallecula  Assessment & Plan:  CT of cervical spine showed  8 mm soft tissue density right vallecula   Had what sounds like laryngoscopy in hospital.   Has f/u appt scheduled with ENT. Not sure who she is seeing. Will have copy of consultation sent here.   2. Type 2 diabetes mellitus with hyperglycemia, with long-term current use of insulin ([facility])  Assessment & Plan:    Lab Results   Component Value Date    HGBA1C 8.5 (H) 2024   Seeing endocrinology for this.   On humalog mixed insulin bid  No episodes of hypoglycemia  She needs refills on her dexcom today  Orders:  -     Continuous Glucose  (Dexcom G7 ) LENA; Use 1 Units in the morning  -     Continuous Glucose Sensor (Dexcom G7 Sensor); Use 1 Device in the morning  -     Albumin / creatinine urine ratio  -     Droplet Pen Needles 32G X 4 MM MISC; Inject under the skin 2 (two) times a day  3. Major depressive disorder with single episode, in full remission ([facility])  stable  4. Intracranial hematoma following injury with loss of consciousness, sequela ([facility])  Assessment & Plan:  Hospitalized at [facility] 24   CT head at time of admission showed :Bilateral anterior-inferior bilateral frontal and temporal   lobes hemorrhagic contusions, largest measuring 2.3 x 1.1 cm in the right   frontal lobe and 1.7 x 1.0 cm in the left frontal lobe. Mild subarachnoid   hemorrhage. Anterior parafalcine subdural hemorrhage measuring 7 mm in   thickness. Left frontotemporoparietal convexity subdural hemorrhage measuring up   to 8 mm the anterior frontal convexity. Left parietal convexity subdural   hemorrhage measuring 5 mm in thickness. Left peritentorial subdural hemorrhage   measuring 5 mm in thickness.     Reviewed her  hospital notes.   Has f/u with neurology in July.   Orders:  -     Ambulatory Referral to Physical Therapy; Future  5. Unsteady gait when walking  -     Ambulatory Referral to Physical Therapy; Future         History of Present Illness     HPI  Patient is a 63 year old female with hypertension, hyperlipidemia, DM2, depression, anxiety and history of breast cancer who is being seen today for f/u from lengthy hospitalization at [facility].   Was hosptialized from 5/11/24-5/31/24 following after an witnessed fall at a race track in [place] that resulted in TBI, subdural and subarachnoid hemorrhages. Hit head on macadam. Lost consciousness and does not recall events prior to or for a week after incident    She states that prior to her fall, had intermittent episodes where she would feel like her legs would give out. She had no dizziness. No chest pain or palpitations.     Since being discharged, still has unsteadiness at times. Uses a cane. She has completed PT.     Still has some issues with memory and occasional headches.     She has f/u with neurology  on 5/7/24.     Seeing Dr Riley for her diabetes. Is requesting refill on her dexcom and on her humalog pen needles    Needs handicap placard form completed.       Review of Systems  Past Medical History:   Diagnosis Date   • Anxiety    • Breast cancer (HCC) 2008    right. bilateral mastectomy. Had chemo   • Diabetes mellitus (HCC)     dx 2010   • IBS (irritable bowel syndrome)    • Major depressive disorder with single episode, in full remission ([facility])    • Subdural hematoma ([facility]) 05/11/2024   • TBI (traumatic brain injury) ([facility]) 05/11/2024     Past Surgical History:   Procedure Laterality Date   • COMPLETE MASTECTOMY W/ SENTINEL NODE BIOPSY Bilateral 2008    expanders placed   • INCISION AND DRAINAGE (I&D) CHEST WALL      breast infection post chemo   • IR PORT REMOVAL AND PLACEMENT NEW SITE       Family History   Problem Relation Age of Onset   • No Known Problems  Mother    • Alzheimer's disease Father    • Heart disease Maternal Grandmother    • Alzheimer's disease Paternal Grandmother    • Fibromyalgia Daughter    • Migraines Daughter    • Anemia Daughter    • No Known Problems Son      Social History     Tobacco Use   • Smoking status: Former     Current packs/day: 0.00     Average packs/day: 0.5 packs/day for 25.0 years (12.5 ttl pk-yrs)     Types: Cigarettes     Start date: 1976     Quit date:      Years since quittin.5   • Smokeless tobacco: Never   • Tobacco comments:     stopped 16 years ago   Vaping Use   • Vaping status: Never Used   Substance and Sexual Activity   • Alcohol use: Yes     Comment: 2 drinks per month   • Drug use: Never   • Sexual activity: Yes     Partners: Male     Current Outpatient Medications on File Prior to Visit   Medication Sig   • Accu-Chek Guide test strip as directed four times a day BEFORE FOOD AND BEDTIME   • Accu-Chek Softclix Lancets lancets As directed   • atorvastatin (LIPITOR) 10 mg tablet Take 1 tablet (10 mg total) by mouth daily   • Blood Glucose Monitoring Suppl (Accu-Chek Guide Me) w/Device KIT    • clotrimazole-betamethasone (LOTRISONE) 1-0.05 % cream Apply topically 2 (two) times a day   • famotidine (PEPCID) 20 mg tablet Take 20 mg by mouth daily   • HumaLOG Mix 75/25 KwikPen (75-25) 100 units/mL injection pen Inject 45 Units under the skin 2 (two) times a day with meals Inject 45 Units under the skin every morning before breakfast AND 22 Units every day before dinner. e11.65 (Type 2 Diabetes).   • lisinopril (ZESTRIL) 10 mg tablet Take 1 tablet (10 mg total) by mouth daily   • QUEtiapine (SEROquel) 25 mg tablet Take 25 mg by mouth daily at bedtime   • venlafaxine (EFFEXOR-XR) 150 mg 24 hr capsule take 2 capsules by mouth once daily   • [DISCONTINUED] Continuous Blood Gluc  (Dexcom G7 ) LENA Use 1 Units in the morning   • [DISCONTINUED] Continuous Blood Gluc Sensor (Dexcom G7 Sensor) Use 1 Device  "in the morning   • [DISCONTINUED] Droplet Pen Needles 32G X 4 MM MISC as directed twice a day   • [DISCONTINUED] Continuous Blood Gluc Transmit (Dexcom G6 Transmitter) MISC USE AS DIRECTED CHANGE EVERY 90 DAYS   • [DISCONTINUED] Farxiga 10 MG tablet Take 1 tablet (10 mg total) by mouth daily   • [DISCONTINUED] insulin glargine (Semglee) 100 units/mL subcutaneous injection Inject 10 Units under the skin daily at bedtime (Patient not taking: Reported on 7/1/2024)   • [DISCONTINUED] metFORMIN (GLUCOPHAGE) 500 mg tablet Take 2 tablets (1,000 mg total) by mouth 2 (two) times a day with meals     Allergies   Allergen Reactions   • Nuts - Food Allergy Vomiting     Eyes can swell   • Sulfa Antibiotics GI Intolerance     Immunization History   Administered Date(s) Administered   • COVID-19 MODERNA VACC 0.5 ML IM 01/05/2021, 02/05/2021, 01/10/2022   • Influenza, injectable, quadrivalent, preservative free 0.5 mL 11/03/2023   • Pneumococcal Conjugate Vaccine 20-valent (Pcv20), Polysace 03/27/2023   • Tdap 07/08/2022     Objective     /80 (BP Location: Left arm, Patient Position: Sitting, Cuff Size: Standard)   Pulse (!) 114   Temp 98.9 °F (37.2 °C) (Tympanic)   Ht 5' 7.5\" (1.715 m)   Wt 76.7 kg (169 lb)   SpO2 98%   BMI 26.08 kg/m²     Physical Exam  Vitals and nursing note reviewed.   Constitutional:       General: She is not in acute distress.     Appearance: Normal appearance. She is not ill-appearing, toxic-appearing or diaphoretic.      Comments: Ambulate with cane. Gait is unsteady   HENT:      Head: Normocephalic and atraumatic.      Mouth/Throat:      Mouth: Mucous membranes are moist.      Pharynx: No posterior oropharyngeal erythema.   Eyes:      Extraocular Movements: Extraocular movements intact.      Conjunctiva/sclera: Conjunctivae normal.      Pupils: Pupils are equal, round, and reactive to light.   Neck:      Vascular: No carotid bruit.   Cardiovascular:      Rate and Rhythm: Normal rate and " regular rhythm.      Heart sounds: No murmur heard.  Pulmonary:      Effort: Pulmonary effort is normal.      Breath sounds: Normal breath sounds.   Musculoskeletal:      Cervical back: Neck supple.      Right lower leg: No edema.      Left lower leg: No edema.   Lymphadenopathy:      Cervical: No cervical adenopathy.   Skin:     General: Skin is warm and dry.   Neurological:      Mental Status: She is alert and oriented to person, place, and time.      Cranial Nerves: No cranial nerve deficit.      Motor: No weakness.      Coordination: Coordination normal.      Deep Tendon Reflexes: Reflexes normal.      Comments: Unable to walk heel to toe. Unable to stand on heels.    Psychiatric:         Mood and Affect: Mood normal.     Administrative Statements

## 2024-07-01 NOTE — ASSESSMENT & PLAN NOTE
Lab Results   Component Value Date    HGBA1C 8.5 (H) 05/16/2024   Seeing endocrinology for this.   On humalog mixed insulin bid  No episodes of hypoglycemia  She needs refills on her dexcom today

## 2024-07-01 NOTE — ASSESSMENT & PLAN NOTE
Hospitalized at [facility] 5/11/24   CT head at time of admission showed :Bilateral anterior-inferior bilateral frontal and temporal   lobes hemorrhagic contusions, largest measuring 2.3 x 1.1 cm in the right   frontal lobe and 1.7 x 1.0 cm in the left frontal lobe. Mild subarachnoid   hemorrhage. Anterior parafalcine subdural hemorrhage measuring 7 mm in   thickness. Left frontotemporoparietal convexity subdural hemorrhage measuring up   to 8 mm the anterior frontal convexity. Left parietal convexity subdural   hemorrhage measuring 5 mm in thickness. Left peritentorial subdural hemorrhage   measuring 5 mm in thickness.

## 2024-07-01 NOTE — ASSESSMENT & PLAN NOTE
CT of cervical spine showed  8 mm soft tissue density right vallecula   Had what sounds like laryngoscopy in hospital.   Has f/u appt scheduled with ENT. Not sure who she is seeing. Will have copy of consultation sent here.

## 2024-07-29 DIAGNOSIS — F32.5 MAJOR DEPRESSIVE DISORDER WITH SINGLE EPISODE, IN FULL REMISSION (HCC): ICD-10-CM

## 2024-07-29 DIAGNOSIS — F41.9 ANXIETY: ICD-10-CM

## 2024-07-29 RX ORDER — VENLAFAXINE HYDROCHLORIDE 150 MG/1
300 CAPSULE, EXTENDED RELEASE ORAL DAILY
Qty: 60 CAPSULE | Refills: 0 | Status: SHIPPED | OUTPATIENT
Start: 2024-07-29

## 2024-07-30 DIAGNOSIS — E11.9 TYPE 2 DIABETES MELLITUS WITHOUT COMPLICATION, WITHOUT LONG-TERM CURRENT USE OF INSULIN (HCC): ICD-10-CM

## 2024-07-30 DIAGNOSIS — K58.2 IRRITABLE BOWEL SYNDROME WITH BOTH CONSTIPATION AND DIARRHEA: Primary | ICD-10-CM

## 2024-07-30 DIAGNOSIS — E78.2 MIXED HYPERLIPIDEMIA: ICD-10-CM

## 2024-07-30 RX ORDER — FAMOTIDINE 20 MG/1
20 TABLET, FILM COATED ORAL DAILY
Qty: 90 TABLET | Refills: 1 | Status: SHIPPED | OUTPATIENT
Start: 2024-07-30

## 2024-07-30 RX ORDER — ATORVASTATIN CALCIUM 10 MG/1
10 TABLET, FILM COATED ORAL DAILY
Qty: 90 TABLET | Refills: 1 | Status: SHIPPED | OUTPATIENT
Start: 2024-07-30

## 2024-07-30 RX ORDER — LISINOPRIL 10 MG/1
10 TABLET ORAL DAILY
Qty: 90 TABLET | Refills: 1 | Status: SHIPPED | OUTPATIENT
Start: 2024-07-30

## 2024-07-31 NOTE — TELEPHONE ENCOUNTER
Left vm with patient to call the office regarding refill request.     has never prescribed the seroquel. Please confirm the prescribing doctor. That is who should refill it.

## 2024-07-31 NOTE — TELEPHONE ENCOUNTER
Received refill request for her seroquel.   Looks like I have never filled this RX  Who has been filling this for her?

## 2024-08-01 ENCOUNTER — PATIENT MESSAGE (OUTPATIENT)
Dept: FAMILY MEDICINE CLINIC | Facility: CLINIC | Age: 64
End: 2024-08-01

## 2024-08-01 DIAGNOSIS — G47.00 INSOMNIA, UNSPECIFIED TYPE: Primary | ICD-10-CM

## 2024-08-01 RX ORDER — QUETIAPINE FUMARATE 25 MG/1
25 TABLET, FILM COATED ORAL
Refills: 0 | OUTPATIENT
Start: 2024-08-01

## 2024-08-05 RX ORDER — QUETIAPINE FUMARATE 25 MG/1
25 TABLET, FILM COATED ORAL
Qty: 30 TABLET | Refills: 0 | Status: SHIPPED | OUTPATIENT
Start: 2024-08-05

## 2024-08-27 ENCOUNTER — EVALUATION (OUTPATIENT)
Facility: CLINIC | Age: 64
End: 2024-08-27
Payer: COMMERCIAL

## 2024-08-27 DIAGNOSIS — R26.81 UNSTEADY GAIT WHEN WALKING: ICD-10-CM

## 2024-08-27 DIAGNOSIS — S06.2X9S: ICD-10-CM

## 2024-08-27 PROCEDURE — 97163 PT EVAL HIGH COMPLEX 45 MIN: CPT

## 2024-08-27 NOTE — PROGRESS NOTES
PT Evaluation     Today's date: 2024  Patient name: Lynette Leyva  : 1960  MRN: [id number]  Referring provider: Keya Chan DO  Dx:   Encounter Diagnosis     ICD-10-CM    1. Intracranial hematoma following injury with loss of consciousness, sequela (HCC)  S06.2X9S Ambulatory Referral to Physical Therapy      2. Unsteady gait when walking  R26.81 Ambulatory Referral to Physical Therapy          Start Time: 311  Stop Time: 405  Total time in clinic (min): 54 minutes    Assessment  Impairments: abnormal coordination, abnormal gait, activity intolerance, impaired balance, impaired physical strength, lacks appropriate home exercise program, pain with function and safety issue    Assessment details: Patient presents to skilled PT post fall resulting in intracranial hematoma. She reports general instability noted with occasional veering in her standing and walking prior to this fall, however, denies any spinning or dizziness.  Patient displays Normal muscle strength with overall grade of 5/5. Patient sensation is Normal throughout lower extremities. Patient coordination is Abnormal per alterate toe tapping and heel to shin test.   Patient balance scores are as follows: 54/56 YOUSSEF, 7 seconds TUG without Assistive Device, and FGA scoring of 23/30 demonstrating instability in advanced gait tasks.  Patient endurance scores are as follows; TBA feet with  6 minute walk test without device, 7 seconds with 5 x sit to stand test, further details on endurance scoring to follow after distance ambulation is assessed.   Patient displays significant reduction in proprioceptive stability with inability to hold tandem stance and inability to step more than 2 steps in tandem gait.   Patient subjective report notes the following functional limitation with general instability with gait on compliant surfaces and loss of balance with rapid changes in direction. Patient will benefit from skilled PT to address noted  impairments and functional limitations they are causing with overall goal to return patient to highest level possible with reduced risk for falls.       Please contact me if you have any questions or recommendations. Thank you for the referral and the opportunity to share in Lynette's care.    Patient verbalized understanding of POC              Understanding of Dx/Px/POC: good     Prognosis: good    Goals  Goals  STG 30 days    Patient will improve static balance with feet together Eyes Closed Foam surface  to 20 seconds indicating reduction in fall risk  Patient will achieve 190 feet improvement with overall distance achieved of TBA feet with 6 minute walk test which is Minimal Detectable Change pre current research standards with endurance to demonstrate enhance functional capacity   Patient will improve FGA scoring 28/30 demonstrating increased stability with advanced gait challenges  Patient will further improve YOUSSEF scoring 56/56  Patient will be independent in basic vestibular and balance challenges for HEP    LT days   Patient will score low risk for falls with 3/4 fall risk measures   Patient will improve ABC scoring by 15% demonstrating increased overall confidence in her mobility  Patient will be able to perform floor transfer without physical assistance   Patient will be able to carry objects without loss of balance  Patient will be able to ambulate outdoors without any loss of balance  Patient will independent in community based HEP to promote increased stability and mobility    Cut off score   All date taken from [facility] Neuro Section or Rehab Measures    YOUSSEF test: 46/56                                              5 x STS Test:  MDC: 6 points                                                  MDC: 2.3 seconds   age norms                                                                 Age Norms   60-69 year old = M: 55, F: 55                        60-69 year old: 11.4 seconds   70-79 year old = M 54,   F: 53                       70-79 year old: 12.6 seconds    80-89 year old = M53,   F: 50                       80-89 year old: 14.8 seconds     TUG test:                                                                     10 Meter Walk Test:  MDC: 4.14 seconds       MDC: .59 ft/sec  Cut off score for Falls                                                  Age Norms  > 13.5 seconds community dwelling adults                20-29; M: 4.56 ft/sec F: 4.62 ft/sec  > 32.2 Frail Elderly                                                     30-39: M 4.76 ft/sec  F: 4.68 ft/sec          40-49: M: 4.79 ft/sec  F: 4.62 ft/sec  6 Minute Walk Test      50-59: M: 4.76 ft/sec  F: 4.56 ft/sec  MDC: 190 feet       60-69: M: 4.56 ft/sec  F: 4.26 ft/sec  Age Norms       70-+    M: 4.36 ft/sec  F: 4.16 ft/sec  60-69:    M: 1876 F: 1765  70-79:    M: 1729 F: 1545  80-89 +: M: 1368 F; 1286     Plan  Patient would benefit from: skilled physical therapy  Planned modality interventions: cryotherapy and hydrotherapy    Planned therapy interventions: manual therapy, motor coordination training, neuromuscular re-education, patient education, postural training, sensory integrative techniques, strengthening, stretching, therapeutic activities, therapeutic exercise, gait training, home exercise program, coordination and balance    Frequency: 2x week  Duration in weeks: 10  Treatment plan discussed with: patient        Subjective Evaluation    History of Present Illness  Mechanism of injury: Patient fell May 11, 2024 sustaining a subdural hematoma.  She was at a dirt track and fell face first.  She has no memory of the incident and retrograde amnesia for about 1 week.  She was hospitalized for 1 week and transitioned to inpatient rehab.  She denies dizziness, and states she did have episodes of confusion and agitation in the hospital.    Patient Goals  Patient goals for therapy: improved balance  Patient goal: Confident that she is not going to trip  or loose her balance.  Pain  Current pain ratin  At best pain ratin  At worst pain ratin  Location: LBP  Quality: grinding    Social Support  Steps to enter house: yes  5  Stairs in house: yes   13  Lives in: multiple-level home  Lives with: spouse and adult children    Employment status: not working        Objective     Concurrent Complaints  Positive for peripheral neuropathy. Negative for headaches, nausea/motion sickness, visual change, hearing loss, memory loss and poor concentration    Strength/Myotome Testing     Left Shoulder     Planes of Motion   Flexion: 5   Abduction: 5     Right Shoulder     Planes of Motion   Flexion: 5   Abduction: 5     Left Elbow   Flexion: 5  Extension: 5    Right Elbow   Flexion: 5  Extension: 5    Left Wrist/Hand   Wrist extension: 5  Wrist flexion: 5    Right Wrist/Hand   Wrist extension: 5  Wrist flexion: 5    Left Hip   Planes of Motion   Flexion: 5  Extension: 4+  Abduction: 4+    Right Hip   Planes of Motion   Flexion: 5  Extension: 4+  Abduction: 4+    Left Knee   Flexion: 5  Extension: 5    Right Knee   Flexion: 5  Extension: 5    Left Ankle/Foot   Dorsiflexion: 5  Plantar flexion: 5    Right Ankle/Foot   Dorsiflexion: 5  Plantar flexion: 5  Neuro Exam:     Dizziness  Negative for disequilibrium, vertigo, motion sickness, light-headedness, rocking or swaying, diplopia and floating or swimming.     Headaches   Patient reports headaches: No.     Oculomotor exam   Oculomotor ROM: WNL  Resting nystagmus: not present   Gaze holding nystagmus: not present left  and not present right  Smooth pursuits: saccadic smooth pursuit  Convergence: normal    Sensation   Light touch LE: left WNL and right WNL    Coordination   Finger to nose: left WNL and right WNL  Rapid alternating movements: LE impaired and UE Impaired    Functional outcomes   Functional outcome gait comment: Decreased stability with vertical HT, inability to perform tandem gait without sidestepping.  General  instability and sidestepping intermittently noted in gait pattern.    VITALS:  143/81,  HR:  99bpm         Precautions: subdural Hematoma from fall, DM, Breast CA, h/o B/L Mastectomy, h/o chemo/RXT  EPOC:  10/30/2024      TESTING 8/27            YOUSSEF 54/56            5x STS 9sec            FGA 23/30            TUG 7sec            ABC 81.88            Neuro Re-Ed             Gait with HT Veering with vertical motion 4 laps            Static balance             Dynamic balance                                                                 Ther Ex                                                                                                                     Ther Activity                                       Gait Training                                       Modalities

## 2024-08-31 DIAGNOSIS — E11.9 TYPE 2 DIABETES MELLITUS WITHOUT COMPLICATION, WITHOUT LONG-TERM CURRENT USE OF INSULIN (HCC): Primary | ICD-10-CM

## 2024-08-31 DIAGNOSIS — G47.00 INSOMNIA, UNSPECIFIED TYPE: ICD-10-CM

## 2024-09-02 RX ORDER — INSULIN LISPRO 100 [IU]/ML
45 INJECTION, SUSPENSION SUBCUTANEOUS 2 TIMES DAILY WITH MEALS
Qty: 15 ML | Refills: 10 | Status: SHIPPED | OUTPATIENT
Start: 2024-09-02

## 2024-09-03 ENCOUNTER — OFFICE VISIT (OUTPATIENT)
Facility: CLINIC | Age: 64
End: 2024-09-03
Payer: COMMERCIAL

## 2024-09-03 DIAGNOSIS — R26.81 UNSTEADY GAIT WHEN WALKING: ICD-10-CM

## 2024-09-03 DIAGNOSIS — S06.2X9S: Primary | ICD-10-CM

## 2024-09-03 PROCEDURE — 97112 NEUROMUSCULAR REEDUCATION: CPT

## 2024-09-03 RX ORDER — QUETIAPINE FUMARATE 25 MG/1
25 TABLET, FILM COATED ORAL
Qty: 30 TABLET | Refills: 0 | Status: SHIPPED | OUTPATIENT
Start: 2024-09-03

## 2024-09-03 NOTE — PROGRESS NOTES
"Daily Note     Today's date: 9/3/2024  Patient name: Lynette Leyva  : 1960  MRN: [id number]  Referring provider: Keya Chan DO  Dx:   Encounter Diagnosis     ICD-10-CM    1. Intracranial hematoma following injury with loss of consciousness, sequela (HCC)  S06.2X9S       2. Unsteady gait when walking  R26.81           Start Time: 232  Stop Time: 319  Total time in clinic (min): 47 minutes    Subjective:   Patient states she has been feeling more allergies this weekend.  No dizziness.        Objective: See treatment diary below      Assessment:   Initiated vestibular challenges with good tolerance.  Nice gains noted with no veering in straight plane HT in gait.  Tandem gait with decreased step outs.  Introduced static balance with good tolerance in corner for safety.  A/P sway with substantial difficulty in df, frequent haptic touch needed.  Patient instructed in HEP including:  A/P sway and static balance partial tandem EC HT and EC 10\" all in corner for safety.  Diagonal HT in gait and tandem gait along hallway for safety.  Patient demonstrates all HEP well and voiced understanding.  Written instructions given.      Plan: Continue per plan of care.   No complaints end of session.  Assess tolerance to vestibular challenges and progress as indicated.  Consider compliant surfaces.     Precautions: subdural Hematoma from fall, DM, Breast CA, h/o B/L Mastectomy, h/o chemo/RXT  EPOC:  10/30/2024      TESTING  9/3           YOUSSEF 54/56            5x STS 9sec            FGA             TUG 7sec            ABC 81.88            Neuro Re-Ed             Gait with HT Veering with vertical motion 4 laps No veering noted with straight plane motion 4 ea;  Repeated on diagonal 4 ea with increased difficulty up to right           Static balance  Partial tandem EC 10\" x4, 2 sets;  EC HT 5x2           Dynamic balance  A/P sway with frequent haptic touch 10x2           Tandem gait  4 laps decreased veering     "       Cone stacking floor to overhead   10x2           VOR 1  45 sec stance FC no symptomsx2                        Ther Ex                                                                                                                     Ther Activity                                       Gait Training                                       Modalities

## 2024-09-10 ENCOUNTER — OFFICE VISIT (OUTPATIENT)
Facility: CLINIC | Age: 64
End: 2024-09-10
Payer: COMMERCIAL

## 2024-09-10 DIAGNOSIS — S06.2X9S: Primary | ICD-10-CM

## 2024-09-10 DIAGNOSIS — R26.81 UNSTEADY GAIT WHEN WALKING: ICD-10-CM

## 2024-09-10 PROCEDURE — 97112 NEUROMUSCULAR REEDUCATION: CPT

## 2024-09-10 NOTE — PROGRESS NOTES
"Daily Note     Today's date: 9/10/2024  Patient name: Lynette Leyva  : 1960  MRN: [id number]  Referring provider: Keya Chan DO  Dx:   Encounter Diagnosis     ICD-10-CM    1. Intracranial hematoma following injury with loss of consciousness, sequela (HCC)  S06.2X9S       2. Unsteady gait when walking  R26.81             Start Time: 0115  Stop Time: 0200  Total time in clinic (min): 45 minutes    Subjective:   Occasional wobbly feeling with diagonal HT.         Objective: See treatment diary below      Assessment:   Overall nice gains with static balance with ability to keep EC and turn her head without haptic touch.  Advanced to foam with difficulty keeping balance with eyes open.  Foam beam with significant tremor and stiffness, self overcorrection noted.  Biodex introduced to reinforce weight shift with good tolerance, improving scoring with reps.  Dual task of sidestepping with mental recall with improved relaxation.  Reviewed HEP and instructed to focus on straight plane HT in gait with increased chante improving.      Plan: Continue per plan of care.   No complaints end of session.  Assess tolerance to vestibular challenges and progress as indicated.  Progress compliant surfaces and dual tasks.     Precautions: subdural Hematoma from fall, DM, Breast CA, h/o B/L Mastectomy, h/o chemo/RXT  EPOC:  10/30/2024      TESTING 8/27 9/3 9/10          YOUSSEF 54/56            5x STS 9sec            FGA             TUG 7sec            ABC 81.88            Neuro Re-Ed             Gait with HT Veering with vertical motion 4 laps No veering noted with straight plane motion 4 ea;  Repeated on diagonal 4 ea with increased difficulty up to right HT with increased chante straight plane 4 ea with cuing;improving outcome with reps          Static balance  Partial tandem EC 10\" x4, 2 sets;  EC HT 5x2 Partial tandem EC 10\" x4, no haptic touch, EC HT 5x2;  Foam EO HT 5x2          Dynamic balance  A/P sway with " frequent haptic touch 10x2 Sidestepping off foam beam 4 laps x2 CTG          Tandem gait  4 laps decreased veering           Cone stacking floor to overhead   10x2 Off foam 10x2 on diagonal          VOR 1  45 sec stance FC no symptomsx2           Biodex   Med Weight shift 1min, Med LOS 32%, 40%, center hold 82%          Ther Ex                                                                                                                     Ther Activity                                       Gait Training                                       Modalities

## 2024-09-16 ENCOUNTER — OFFICE VISIT (OUTPATIENT)
Facility: CLINIC | Age: 64
End: 2024-09-16
Payer: COMMERCIAL

## 2024-09-16 DIAGNOSIS — S06.2X9S: ICD-10-CM

## 2024-09-16 DIAGNOSIS — R26.81 UNSTEADY GAIT WHEN WALKING: Primary | ICD-10-CM

## 2024-09-16 PROCEDURE — 97112 NEUROMUSCULAR REEDUCATION: CPT

## 2024-09-16 NOTE — PROGRESS NOTES
"Daily Note     Today's date: 2024  Patient name: Lynette Leyva  : 1960  MRN: [id number]  Referring provider: Keya Chan DO  Dx:   Encounter Diagnosis     ICD-10-CM    1. Unsteady gait when walking  R26.81       2. Intracranial hematoma following injury with loss of consciousness, sequela (HCC)  S06.2X9S               Start Time: 317  Stop Time: 0400  Total time in clinic (min): 43 minutes    Subjective:   No current complaints.  No falls noted.        Objective: See treatment diary below      Assessment:   Nice gains in static balance with ability to progress to foam with EC HT with minimal haptic touch.  Foam beam better to left lead than to right.  She expressed occasionally feeling disconnected to her feet.  Will do further central testing to determine if sensory integration may be beneficial with details to follow.  Instructed in beans in bin with item finding and gastroc stretch for further gains in range with good demonstration and voiced understanding.  Written instructions given.      Plan: Continue per plan of care.   No complaints end of session.  Assess tolerance to vestibular challenges and progress as indicated.  Progress compliant surfaces and dual tasks.     Precautions: subdural Hematoma from fall, DM, Breast CA, h/o B/L Mastectomy, h/o chemo/RXT  EPOC:  10/30/2024      TESTING  9/3 9/10 9/16         YOUSSEF 54/56            5x STS 9sec            FGA 30            TUG 7sec            ABC 81.88            Neuro Re-Ed             Gait with HT Veering with vertical motion 4 laps No veering noted with straight plane motion 4 ea;  Repeated on diagonal 4 ea with increased difficulty up to right HT with increased chante straight plane 4 ea with cuing;improving outcome with reps 2 laps ea         Static balance  Partial tandem EC 10\" x4, 2 sets;  EC HT 5x2 Partial tandem EC 10\" x4, no haptic touch, EC HT 5x2;  Foam EO HT 5x2 Foam FC 10\" x5, 2 sets, Foam EC HT 5x2 - repeated x2    " "     Dynamic balance  A/P sway with frequent haptic touch 10x2 Sidestepping off foam beam 4 laps x2 CTG Sidestpping foam beam CTG 4 laps, occ backward stepping leading right         Tandem gait  4 laps decreased veering           Cone stacking floor to overhead   10x2 Off foam 10x2 on diagonal          VOR 1  45 sec stance FC no symptomsx2           Sensory input    Instructed in beans in bin with item finding         Biodex   Med Weight shift 1min, Med LOS 32%, 40%, center hold 82%          Ther Ex             Gastroc stretch seated with strap    20\" x3 right                                                                                                    Ther Activity                                       Gait Training                                       Modalities                                            "

## 2024-09-17 DIAGNOSIS — F32.5 MAJOR DEPRESSIVE DISORDER WITH SINGLE EPISODE, IN FULL REMISSION (HCC): ICD-10-CM

## 2024-09-17 DIAGNOSIS — F41.9 ANXIETY: ICD-10-CM

## 2024-09-18 RX ORDER — VENLAFAXINE HYDROCHLORIDE 150 MG/1
300 CAPSULE, EXTENDED RELEASE ORAL DAILY
Qty: 60 CAPSULE | Refills: 5 | Status: SHIPPED | OUTPATIENT
Start: 2024-09-18

## 2024-09-23 ENCOUNTER — OFFICE VISIT (OUTPATIENT)
Facility: CLINIC | Age: 64
End: 2024-09-23
Payer: COMMERCIAL

## 2024-09-23 DIAGNOSIS — S06.2X9S: Primary | ICD-10-CM

## 2024-09-23 DIAGNOSIS — R26.81 UNSTEADY GAIT WHEN WALKING: ICD-10-CM

## 2024-09-23 PROCEDURE — 97112 NEUROMUSCULAR REEDUCATION: CPT

## 2024-09-23 NOTE — PROGRESS NOTES
"Daily Note     Today's date: 2024  Patient name: Lynette Leyva  : 1960  MRN: [id number]  Referring provider: Keya Chan DO  Dx:   Encounter Diagnosis     ICD-10-CM    1. Intracranial hematoma following injury with loss of consciousness, sequela (HCC)  S06.2X9S       2. Unsteady gait when walking  R26.81                 Start Time: 0235  Stop Time: 0315  Total time in clinic (min): 40 minutes    Subjective:   No current complaints.  No falls noted.        Objective: See treatment diary below      Assessment:   Gains noted in self correction off foam beam with ability to stop posterior stepping.  A/P sway with instruction to use trunk correction to assist with good carryover.  Gait with HT advanced to diagonal with increased ease, patient instructed to continue at home with good demonstration.  Tandem gait improved significantly with ability to achieve 10 steps, increased distraction with difficulty, consider dual tasks to further gains.       Plan: Continue per plan of care.   No complaints end of session.  Assess tolerance to vestibular challenges and progress as indicated.  Progress compliant surfaces and dual tasks.     Precautions: subdural Hematoma from fall, DM, Breast CA, h/o B/L Mastectomy, h/o chemo/RXT  EPOC:  10/30/2024      TESTING  9/3 9/10 9/16 9/23        YOUSSEF 54/56            5x STS 9sec            FGA             TUG 7sec            ABC 81.88            Neuro Re-Ed             Gait with HT Veering with vertical motion 4 laps No veering noted with straight plane motion 4 ea;  Repeated on diagonal 4 ea with increased difficulty up to right HT with increased chante straight plane 4 ea with cuing;improving outcome with reps 2 laps ea 4 laps ea, diagonal introduced with ease, no veering evident 4 laps ea, occ cuing to increase motion        Static balance  Partial tandem EC 10\" x4, 2 sets;  EC HT 5x2 Partial tandem EC 10\" x4, no haptic touch, EC HT 5x2;  Foam EO HT 5x2 Foam " "FC 10\" x5, 2 sets, Foam EC HT 5x2 - repeated x2 Partial tandem EC 10\" x4, EC HT 5x2,         Dynamic balance  A/P sway with frequent haptic touch 10x2 Sidestepping off foam beam 4 laps x2 CTG Sidestpping foam beam CTG 4 laps, occ backward stepping leading right Sidestepping foam beam supervised 4 laps, self correction on backward LOB        Tandem gait  4 laps decreased veering   4 laps no veering        Cone stacking floor to overhead   10x2 Off foam 10x2 on diagonal  Gait with self ball toss 4 laps,        VOR 1  45 sec stance FC no symptomsx2           Sensory input    Instructed in beans in bin with item finding Hula step through 10        Biodex   Med Weight shift 1min, Med LOS 32%, 40%, center hold 82%          Ther Ex             Gastroc stretch seated with strap    20\" x3 right                                                                                                    Ther Activity                                       Gait Training                                       Modalities                                            "

## 2024-09-26 DIAGNOSIS — G47.00 INSOMNIA, UNSPECIFIED TYPE: ICD-10-CM

## 2024-09-26 RX ORDER — QUETIAPINE FUMARATE 25 MG/1
25 TABLET, FILM COATED ORAL
Qty: 30 TABLET | Refills: 0 | Status: SHIPPED | OUTPATIENT
Start: 2024-09-26

## 2024-09-30 ENCOUNTER — OFFICE VISIT (OUTPATIENT)
Facility: CLINIC | Age: 64
End: 2024-09-30
Payer: COMMERCIAL

## 2024-09-30 DIAGNOSIS — S06.2X9S: ICD-10-CM

## 2024-09-30 DIAGNOSIS — R26.81 UNSTEADY GAIT WHEN WALKING: Primary | ICD-10-CM

## 2024-09-30 PROCEDURE — 97112 NEUROMUSCULAR REEDUCATION: CPT

## 2024-09-30 NOTE — PROGRESS NOTES
PT PROGRESS NOTE/ DAILY NOTE    Today's date: 2024  Patient name: Lynette Leyva  : 1960  MRN: [id number]  Referring provider: Keya Chan DO  Dx:   Encounter Diagnosis     ICD-10-CM    1. Unsteady gait when walking  R26.81       2. Intracranial hematoma following injury with loss of consciousness, sequela (HCC)  S06.2X9S             Start Time: 445  Stop Time: 05  Total time in clinic (min): 40 minutes    Assessment  Impairments: abnormal coordination, abnormal gait, activity intolerance, impaired balance, impaired physical strength, lacks appropriate home exercise program, pain with function and safety issue    Assessment details: Patient presents to skilled PT post fall resulting in intracranial hematoma. She reports general instability noted with occasional veering in her standing and walking prior to this fall, however, denies any spinning or dizziness.  Patient displays Normal muscle strength with overall grade of 5/5. Patient sensation is Normal throughout lower extremities. Patient coordination is Abnormal per alterate toe tapping and heel to shin test.   Patient balance scores are as follows: 54/56 YOUSSEF, 7 seconds TUG without Assistive Device, and FGA scoring of 23/30 demonstrating instability in advanced gait tasks.  Patient endurance scores are as follows; TBA feet with  6 minute walk test without device, 7 seconds with 5 x sit to stand test, further details on endurance scoring to follow after distance ambulation is assessed.   Patient displays significant reduction in proprioceptive stability with inability to hold tandem stance and inability to step more than 2 steps in tandem gait.   Patient subjective report notes the following functional limitation with general instability with gait on compliant surfaces and loss of balance with rapid changes in direction. Patient will benefit from skilled PT to address noted impairments and functional limitations they are causing with overall  goal to return patient to highest level possible with reduced risk for falls.       Please contact me if you have any questions or recommendations. Thank you for the referral and the opportunity to share in Lynette's care.    Patient verbalized understanding of POC      Patient has made substantial gains with PT with improved YOUSSEF scoring 56/56 and improved FGA scoring 28/30.  6 min walk test scoring 1380' with improved negotiation over curbs and ramps.  Patient has made substantial gains on level surfaces, unevens remain challenging.  Recommend continuation of PT per POC.  Patient is in agreement with treatment plan.                Understanding of Dx/Px/POC: good     Prognosis: good    Goals  Goals  STG 30 days    Patient will improve static balance with feet together Eyes Closed Foam surface  to 20 seconds indicating reduction in fall risk- MET  Patient will achieve 190 feet improvement with overall distance achieved of 1570 feet with 6 minute walk test which is Minimal Detectable Change pre current research standards with endurance to demonstrate enhance functional capacity - scored  Patient will improve FGA scoring /30 demonstrating increased stability with advanced gait challenges- MET  Patient will further improve YOUSSEF scoring 56/56  Patient will be independent in basic vestibular and balance challenges for HEP- MET    LT days   Patient will score low risk for falls with 3/4 fall risk measures   Patient will improve ABC scoring by 15% demonstrating increased overall confidence in her mobility  Patient will be able to perform floor transfer without physical assistance   Patient will be able to carry objects without loss of balance  Patient will be able to ambulate outdoors without any loss of balance  Patient will independent in community based HEP to promote increased stability and mobility    Cut off score   All date taken from [facility] Neuro Section or Rehab Measures    YOUSSEF test: 46/56                                               5 x STS Test:  MDC: 6 points                                                  MDC: 2.3 seconds   age norms                                                                 Age Norms   60-69 year old = M: 55, F: 55                        60-69 year old: 11.4 seconds   70-79 year old = M 54,  F: 53                       70-79 year old: 12.6 seconds    80-89 year old = M53,   F: 50                       80-89 year old: 14.8 seconds     TUG test:                                                                     10 Meter Walk Test:  MDC: 4.14 seconds       MDC: .59 ft/sec  Cut off score for Falls                                                  Age Norms  > 13.5 seconds community dwelling adults                20-29; M: 4.56 ft/sec F: 4.62 ft/sec  > 32.2 Frail Elderly                                                     30-39: M 4.76 ft/sec  F: 4.68 ft/sec          40-49: M: 4.79 ft/sec  F: 4.62 ft/sec  6 Minute Walk Test      50-59: M: 4.76 ft/sec  F: 4.56 ft/sec  MDC: 190 feet       60-69: M: 4.56 ft/sec  F: 4.26 ft/sec  Age Norms       70-+    M: 4.36 ft/sec  F: 4.16 ft/sec  60-69:    M: 1876 F: 1765  70-79:    M: 1729 F: 1545  80-89 +: M: 1368 F; 1286     Plan  Patient would benefit from: skilled physical therapy  Planned modality interventions: cryotherapy and hydrotherapy    Planned therapy interventions: manual therapy, motor coordination training, neuromuscular re-education, patient education, postural training, sensory integrative techniques, strengthening, stretching, therapeutic activities, therapeutic exercise, gait training, home exercise program, coordination and balance    Frequency: 2x week  Duration in weeks: 6  Treatment plan discussed with: patient        Subjective Evaluation    History of Present Illness  Mechanism of injury: Patient fell May 11, 2024 sustaining a subdural hematoma.  She was at a dirt track and fell face first.  She has no memory of the incident and  retrograde amnesia for about 1 week.  She was hospitalized for 1 week and transitioned to inpatient rehab.  She denies dizziness, and states she did have episodes of confusion and agitation in the hospital.        Patient states she is overall feeling better shopping and moving around her home without LOB.  No recent dizziness noted.  She continues to feel slight unsteadiness with uneven surfaces.  Patient Goals  Patient goals for therapy: improved balance  Patient goal: Confident that she is not going to trip or loose her balance.  Pain  Current pain ratin  At best pain ratin  At worst pain rating: 3  Location: LBP  Quality: grinding    Social Support  Steps to enter house: yes  5  Stairs in house: yes   13  Lives in: multiple-level home  Lives with: spouse and adult children    Employment status: not working        Objective     Concurrent Complaints  Positive for peripheral neuropathy. Negative for headaches, nausea/motion sickness, visual change, hearing loss, memory loss and poor concentration    Strength/Myotome Testing     Left Shoulder     Planes of Motion   Flexion: 5   Abduction: 5     Right Shoulder     Planes of Motion   Flexion: 5   Abduction: 5     Left Elbow   Flexion: 5  Extension: 5    Right Elbow   Flexion: 5  Extension: 5    Left Wrist/Hand   Wrist extension: 5  Wrist flexion: 5    Right Wrist/Hand   Wrist extension: 5  Wrist flexion: 5    Left Hip   Planes of Motion   Flexion: 5  Extension: 4+  Abduction: 4+    Right Hip   Planes of Motion   Flexion: 5  Extension: 4+  Abduction: 4+    Left Knee   Flexion: 5  Extension: 5    Right Knee   Flexion: 5  Extension: 5    Left Ankle/Foot   Dorsiflexion: 5  Plantar flexion: 5    Right Ankle/Foot   Dorsiflexion: 5  Plantar flexion: 5  Neuro Exam:     Dizziness  Negative for disequilibrium, vertigo, motion sickness, light-headedness, rocking or swaying, diplopia and floating or swimming.     Headaches   Patient reports headaches: No.  "    Oculomotor exam   Oculomotor ROM: WNL  Resting nystagmus: not present   Gaze holding nystagmus: not present left  and not present right  Smooth pursuits: saccadic smooth pursuit  Convergence: normal    Sensation   Light touch LE: left WNL and right WNL    Coordination   Finger to nose: left WNL and right WNL  Rapid alternating movements: LE impaired and UE Impaired    Functional outcomes   Functional outcome gait comment: Decreased stability with vertical HT, inability to perform tandem gait without sidestepping.  General instability and sidestepping intermittently noted in gait pattern.    9/30  Nice gains in HT in gait with no veering evident.  Tandem gait continues to be challenging.           Precautions: subdural Hematoma from fall, DM, Breast CA, h/o B/L Mastectomy, h/o chemo/RXT  EPOC:  10/30/2024      TESTING 8/27 9/30           YOUSSEF 54/56 56/56           5x STS 9sec 8.2           FGA 23/30 28/30           TUG 7sec 6sec           ABC 81.88 94.38           6 min walk test  1380'           Neuro Re-Ed             Gait with HT Veering with vertical motion 4 laps No veering noted with HT 4 laps           Static balance  Partial tandem EC 10\" x3, Foam FT 30\" x2 increased sway           Dynamic balance  Alternating cone taps on firm; cone taps on levels           Outside ambulation  Curbs, ramps and unevens                                                  Ther Ex                                                                                                                     Ther Activity                                       Gait Training                                       Modalities                                            "

## 2024-10-05 DIAGNOSIS — G47.00 INSOMNIA, UNSPECIFIED TYPE: ICD-10-CM

## 2024-10-07 ENCOUNTER — APPOINTMENT (OUTPATIENT)
Facility: CLINIC | Age: 64
End: 2024-10-07
Payer: COMMERCIAL

## 2024-10-07 DIAGNOSIS — G47.00 INSOMNIA, UNSPECIFIED TYPE: ICD-10-CM

## 2024-10-07 RX ORDER — QUETIAPINE FUMARATE 25 MG/1
25 TABLET, FILM COATED ORAL
Qty: 30 TABLET | Refills: 0 | Status: SHIPPED | OUTPATIENT
Start: 2024-10-07 | End: 2024-10-09

## 2024-10-08 NOTE — TELEPHONE ENCOUNTER
Left message for patient to call back.    Needs appointment for AWV which is over due.     Schedule for after Nov 3rd as AWV and DM follow up.

## 2024-10-09 RX ORDER — QUETIAPINE FUMARATE 25 MG/1
25 TABLET, FILM COATED ORAL
Qty: 90 TABLET | Refills: 0 | Status: SHIPPED | OUTPATIENT
Start: 2024-10-09

## 2024-10-11 DIAGNOSIS — F41.9 ANXIETY: ICD-10-CM

## 2024-10-11 DIAGNOSIS — F32.5 MAJOR DEPRESSIVE DISORDER WITH SINGLE EPISODE, IN FULL REMISSION (HCC): ICD-10-CM

## 2024-10-12 RX ORDER — VENLAFAXINE HYDROCHLORIDE 150 MG/1
300 CAPSULE, EXTENDED RELEASE ORAL DAILY
Qty: 180 CAPSULE | Refills: 5 | Status: SHIPPED | OUTPATIENT
Start: 2024-10-12

## 2024-10-14 ENCOUNTER — OFFICE VISIT (OUTPATIENT)
Facility: CLINIC | Age: 64
End: 2024-10-14
Payer: COMMERCIAL

## 2024-10-14 DIAGNOSIS — S06.2X9S: ICD-10-CM

## 2024-10-14 DIAGNOSIS — R26.81 UNSTEADY GAIT WHEN WALKING: Primary | ICD-10-CM

## 2024-10-14 PROCEDURE — 97112 NEUROMUSCULAR REEDUCATION: CPT

## 2024-10-14 NOTE — PROGRESS NOTES
"Daily Note     Today's date: 10/14/2024  Patient name: Lynette Leyva  : 1960  MRN: [id number]  Referring provider: Keya Chan DO  Dx:   Encounter Diagnosis     ICD-10-CM    1. Unsteady gait when walking  R26.81       2. Intracranial hematoma following injury with loss of consciousness, sequela (HCC)  S06.2X9S                   Start Time: 0455  Stop Time: 0535  Total time in clinic (min): 40 minutes    Subjective:   Sinus congestion with a HA with pressure around her left jaw line.      Objective: See treatment diary below      Assessment:    Added HT on foam beam with sidestepping with good tolerance.  No LOB with sidestepping and good self correction.  Given additional HEP with reaches in cone taps for weight shift in unilateral stance, good demonstration.  Patient voiced understanding and has been given written instructions.      Plan: Continue per plan of care.   No complaints end of session.  Assess tolerance to vestibular challenges and progress as indicated.  Progress compliant surfaces and dual tasks.  Progressing nicely towards goals.     Precautions: subdural Hematoma from fall, DM, Breast CA, h/o B/L Mastectomy, h/o chemo/RXT  EPOC:  10/30/2024      TESTING  9/3 9/10 9/16 9/23 10/14       YOUSSEF 54/56            5x STS 9sec            FGA             TUG 7sec            ABC 81.88            Neuro Re-Ed             Gait with HT Veering with vertical motion 4 laps No veering noted with straight plane motion 4 ea;  Repeated on diagonal 4 ea with increased difficulty up to right HT with increased chante straight plane 4 ea with cuing;improving outcome with reps 2 laps ea 4 laps ea, diagonal introduced with ease, no veering evident 4 laps ea, occ cuing to increase motion 4 laps ea, diagonals with ease, 4 laps       Static balance  Partial tandem EC 10\" x4, 2 sets;  EC HT 5x2 Partial tandem EC 10\" x4, no haptic touch, EC HT 5x2;  Foam EO HT 5x2 Foam FC 10\" x5, 2 sets, Foam EC HT 5x2 - " "repeated x2 Partial tandem EC 10\" x4, EC HT 5x2,  Partial tandem EC 10\" x4, EC HT 5x2, foam FT EC HT 5x2       Dynamic balance  A/P sway with frequent haptic touch 10x2 Sidestepping off foam beam 4 laps x2 CTG Sidestpping foam beam CTG 4 laps, occ backward stepping leading right Sidestepping foam beam supervised 4 laps, self correction on backward LOB Sidestepping foam beam 2 laps, added HT on beam with sidestppeing 4 laps, no LOB noted       Tandem gait  4 laps decreased veering   4 laps no veering 4 laps       Cone stacking floor to overhead   10x2 Off foam 10x2 on diagonal  Gait with self ball toss 4 laps, Unilateral stance 10sec x2, cone taps sequentially 3x4       VOR 1  45 sec stance FC no symptomsx2           Sensory input    Instructed in beans in bin with item finding Hula step through 10 Reaches with cone taps 4x2       Biodex   Med Weight shift 1min, Med LOS 32%, 40%, center hold 82%          Ther Ex             Gastroc stretch seated with strap    20\" x3 right                                                                                                    Ther Activity                                       Gait Training                                       Modalities                                            "

## 2024-10-21 ENCOUNTER — APPOINTMENT (OUTPATIENT)
Facility: CLINIC | Age: 64
End: 2024-10-21
Payer: COMMERCIAL

## 2024-10-28 ENCOUNTER — OFFICE VISIT (OUTPATIENT)
Facility: CLINIC | Age: 64
End: 2024-10-28
Payer: COMMERCIAL

## 2024-10-28 DIAGNOSIS — S06.2X9S: Primary | ICD-10-CM

## 2024-10-28 DIAGNOSIS — R26.81 UNSTEADY GAIT WHEN WALKING: ICD-10-CM

## 2024-10-28 PROCEDURE — 97112 NEUROMUSCULAR REEDUCATION: CPT

## 2024-10-28 NOTE — PROGRESS NOTES
PT DISCHARGE / DAILY NOTE    Today's date: 10/28/2024  Patient name: Lynette Leyva  : 1960  MRN: [id number]  Referring provider: Keya Chan DO  Dx:   Encounter Diagnosis     ICD-10-CM    1. Intracranial hematoma following injury with loss of consciousness, sequela (HCC)  S06.2X9S       2. Unsteady gait when walking  R26.81               Start Time: 447  Stop Time: 05  Total time in clinic (min): 38 minutes    Assessment    Assessment details: Patient presents to skilled PT post fall resulting in intracranial hematoma. She reports general instability noted with occasional veering in her standing and walking prior to this fall, however, denies any spinning or dizziness.  Patient displays Normal muscle strength with overall grade of 5/5. Patient sensation is Normal throughout lower extremities. Patient coordination is Abnormal per alterate toe tapping and heel to shin test.   Patient balance scores are as follows: 54/56 YOUSSEF, 7 seconds TUG without Assistive Device, and FGA scoring of 23/30 demonstrating instability in advanced gait tasks.  Patient endurance scores are as follows; TBA feet with  6 minute walk test without device, 7 seconds with 5 x sit to stand test, further details on endurance scoring to follow after distance ambulation is assessed.   Patient displays significant reduction in proprioceptive stability with inability to hold tandem stance and inability to step more than 2 steps in tandem gait.   Patient subjective report notes the following functional limitation with general instability with gait on compliant surfaces and loss of balance with rapid changes in direction. Patient will benefit from skilled PT to address noted impairments and functional limitations they are causing with overall goal to return patient to highest level possible with reduced risk for falls.       Please contact me if you have any questions or recommendations. Thank you for the referral and the opportunity  to share in Lynette's care.    Patient verbalized understanding of POC      Patient has made substantial gains with PT with improved YOUSSEF scoring 56/56 and improved FGA scoring 28/30.  6 min walk test scoring 1380' with improved negotiation over curbs and ramps.  Patient has made substantial gains on level surfaces, unevens remain challenging.  Recommend continuation of PT per POC.  Patient is in agreement with treatment plan.      10/28  Patient has achieved maximal gains from PT at this time improving FGA to 29/30 and increasing 6 min walk test to 1500'.  Reviewed HEP with good demonstration.  She has been instructed to contact PT should any questions or problems arise and to continue HEP.  She voiced understanding to all instructions and is in agreement with discharge plans.               Understanding of Dx/Px/POC: good     Prognosis: good    Goals  Goals  STG 30 days    Patient will improve static balance with feet together Eyes Closed Foam surface  to 20 seconds indicating reduction in fall risk- MET  Patient will achieve 190 feet improvement with overall distance achieved of 1570 feet with 6 minute walk test which is Minimal Detectable Change pre current research standards with endurance to demonstrate enhance functional capacity - mostly met  Patient will improve FGA scoring 28/30 demonstrating increased stability with advanced gait challenges- MET  Patient will further improve YOUSSEF scoring 56/56- MET  Patient will be independent in basic vestibular and balance challenges for HEP- MET    LT days   Patient will score low risk for falls with 3/4 fall risk measures -MET  Patient will improve ABC scoring by 15% demonstrating increased overall confidence in her mobility- MET  Patient will be able to perform floor transfer without physical assistance -MET  Patient will be able to carry objects without loss of balance-MET  Patient will be able to ambulate outdoors without any loss of balance-MET  Patient  will independent in community based [facility] to promote increased stability and mobility-MET    Cut off score   All date taken from [facility] Neuro Section or Rehab Measures    YOUSSEF test: 46/56                                              5 x STS Test:  MDC: 6 points                                                  MDC: 2.3 seconds   age norms                                                                 Age Norms   60-69 year old = M: 55, F: 55                        60-69 year old: 11.4 seconds   70-79 year old = M 54,  F: 53                       70-79 year old: 12.6 seconds    80-89 year old = M53,   F: 50                       80-89 year old: 14.8 seconds     TUG test:                                                                     10 Meter Walk Test:  MDC: 4.14 seconds       MDC: .59 ft/sec  Cut off score for Falls                                                  Age Norms  > 13.5 seconds community dwelling adults                20-29; M: 4.56 ft/sec F: 4.62 ft/sec  > 32.2 Frail Elderly                                                     30-39: M 4.76 ft/sec  F: 4.68 ft/sec          40-49: M: 4.79 ft/sec  F: 4.62 ft/sec  6 Minute Walk Test      50-59: M: 4.76 ft/sec  F: 4.56 ft/sec  MDC: 190 feet       60-69: M: 4.56 ft/sec  F: 4.26 ft/sec  Age Norms       70-+    M: 4.36 ft/sec  F: 4.16 ft/sec  60-69:    M: 1876 F: 1765  70-79:    M: 1729 F: 1545  80-89 +: M: 1368 F; 1286     Plan    Treatment plan discussed with: patient        Subjective Evaluation    History of Present Illness  Mechanism of injury: Patient fell May 11, 2024 sustaining a subdural hematoma.  She was at a dirt track and fell face first.  She has no memory of the incident and retrograde amnesia for about 1 week.  She was hospitalized for 1 week and transitioned to inpatient rehab.  She denies dizziness, and states she did have episodes of confusion and agitation in the hospital.      9/30  Patient states she is overall feeling better shopping and  moving around her home without LOB.  No recent dizziness noted.  She continues to feel slight unsteadiness with uneven surfaces.    10/28  No falls noted.  No pain or dizziness noted.  Patient Goals  Patient goals for therapy: improved balance  Patient goal: Confident that she is not going to trip or loose her balance.  Pain  Current pain ratin  At best pain ratin  At worst pain rating: 3  Location: LBP  Quality: grinding    Social Support  Steps to enter house: yes  5  Stairs in house: yes   13  Lives in: multiple-level home  Lives with: spouse and adult children    Employment status: not working        Objective     Concurrent Complaints  Positive for peripheral neuropathy. Negative for headaches, nausea/motion sickness, visual change, hearing loss, memory loss and poor concentration    Strength/Myotome Testing     Left Shoulder     Planes of Motion   Flexion: 5   Abduction: 5     Right Shoulder     Planes of Motion   Flexion: 5   Abduction: 5     Left Elbow   Flexion: 5  Extension: 5    Right Elbow   Flexion: 5  Extension: 5    Left Wrist/Hand   Wrist extension: 5  Wrist flexion: 5    Right Wrist/Hand   Wrist extension: 5  Wrist flexion: 5    Left Hip   Planes of Motion   Flexion: 5  Extension: 4+  Abduction: 4+    Right Hip   Planes of Motion   Flexion: 5  Extension: 4+  Abduction: 4+    Left Knee   Flexion: 5  Extension: 5    Right Knee   Flexion: 5  Extension: 5    Left Ankle/Foot   Dorsiflexion: 5  Plantar flexion: 5    Right Ankle/Foot   Dorsiflexion: 5  Plantar flexion: 5  Neuro Exam:     Dizziness  Negative for disequilibrium, vertigo, motion sickness, light-headedness, rocking or swaying, diplopia and floating or swimming.     Headaches   Patient reports headaches: No.     Oculomotor exam   Oculomotor ROM: WNL  Resting nystagmus: not present   Gaze holding nystagmus: not present left  and not present right  Smooth pursuits: saccadic smooth pursuit  Convergence: normal    Sensation   Light touch  "LE: left WNL and right WNL    Coordination   Finger to nose: left WNL and right WNL  Rapid alternating movements: LE impaired and UE Impaired    Functional outcomes   Functional outcome gait comment: Decreased stability with vertical HT, inability to perform tandem gait without sidestepping.  General instability and sidestepping intermittently noted in gait pattern.    9/30  Nice gains in HT in gait with no veering evident.  Tandem gait continues to be challenging.    10/28  Tandem gait without veering today.             Precautions: subdural Hematoma from fall, DM, Breast CA, h/o B/L Mastectomy, h/o chemo/RXT  EPOC:  10/30/2024      TESTING 8/27 9/30 10/28          YOUSSEF 54/56 56/56 56/56          5x STS 9sec 8.2 8sec          FGA 23/30 28/30 29/30          TUG 7sec 6sec           ABC 81.88 94.38 95          6 min walk test  1380' 1500'          Neuro Re-Ed             Gait with HT Veering with vertical motion 4 laps No veering noted with HT 4 laps No veering noted improved pacing with HT 4 laps          Static balance  Partial tandem EC 10\" x3, Foam FT 30\" x2 increased sway Partial tandem EC 10\" x4, EC HT 5x2; cuing for breathing          Dynamic balance  Alternating cone taps on firm; cone taps on levels           Outside ambulation  Curbs, ramps and unevens                                                  Ther Ex                                                                                                                     Ther Activity                                       Gait Training                                       Modalities                                            "

## 2024-10-30 DIAGNOSIS — E78.2 MIXED HYPERLIPIDEMIA: ICD-10-CM

## 2024-10-30 DIAGNOSIS — G47.00 INSOMNIA, UNSPECIFIED TYPE: ICD-10-CM

## 2024-10-30 DIAGNOSIS — E11.9 TYPE 2 DIABETES MELLITUS WITHOUT COMPLICATION, WITHOUT LONG-TERM CURRENT USE OF INSULIN (HCC): ICD-10-CM

## 2024-10-30 DIAGNOSIS — K58.2 IRRITABLE BOWEL SYNDROME WITH BOTH CONSTIPATION AND DIARRHEA: ICD-10-CM

## 2024-10-30 DIAGNOSIS — F32.5 MAJOR DEPRESSIVE DISORDER WITH SINGLE EPISODE, IN FULL REMISSION (HCC): ICD-10-CM

## 2024-10-30 DIAGNOSIS — F41.9 ANXIETY: ICD-10-CM

## 2024-10-31 RX ORDER — VENLAFAXINE HYDROCHLORIDE 150 MG/1
300 CAPSULE, EXTENDED RELEASE ORAL DAILY
Qty: 180 CAPSULE | Refills: 0 | Status: SHIPPED | OUTPATIENT
Start: 2024-10-31

## 2024-10-31 RX ORDER — CLOTRIMAZOLE AND BETAMETHASONE DIPROPIONATE 10; .64 MG/G; MG/G
CREAM TOPICAL 2 TIMES DAILY
Qty: 30 G | Refills: 0 | Status: SHIPPED | OUTPATIENT
Start: 2024-10-31

## 2024-10-31 RX ORDER — QUETIAPINE FUMARATE 25 MG/1
25 TABLET, FILM COATED ORAL
Qty: 90 TABLET | Refills: 0 | Status: CANCELLED | OUTPATIENT
Start: 2024-10-31

## 2024-10-31 RX ORDER — LISINOPRIL 10 MG/1
10 TABLET ORAL DAILY
Qty: 90 TABLET | Refills: 0 | Status: SHIPPED | OUTPATIENT
Start: 2024-10-31

## 2024-10-31 RX ORDER — FAMOTIDINE 20 MG/1
20 TABLET, FILM COATED ORAL DAILY
Qty: 90 TABLET | Refills: 0 | Status: SHIPPED | OUTPATIENT
Start: 2024-10-31

## 2024-10-31 RX ORDER — INSULIN LISPRO 100 [IU]/ML
45 INJECTION, SUSPENSION SUBCUTANEOUS 2 TIMES DAILY WITH MEALS
Qty: 15 ML | Refills: 0 | Status: SHIPPED | OUTPATIENT
Start: 2024-10-31

## 2024-10-31 RX ORDER — ATORVASTATIN CALCIUM 10 MG/1
10 TABLET, FILM COATED ORAL DAILY
Qty: 30 TABLET | Refills: 0 | Status: SHIPPED | OUTPATIENT
Start: 2024-10-31

## 2024-10-31 NOTE — TELEPHONE ENCOUNTER
She has active lab orders ( placed last November)  Seroquel was just refilled for #90 days on 10/9 so should not need refill on this until January  Lotrisone refilled

## 2024-10-31 NOTE — TELEPHONE ENCOUNTER
Patient needs updated blood work. Please place orders. A courtesy refill was provided.   Lipid Panel

## 2024-11-05 NOTE — TELEPHONE ENCOUNTER
Looks like in messages patient wanted a copy of PDMP query.   Let her know we only use the PDMP for controlled substances.   I can send screenshot of her med list which shows that she had rx for seroquel  sent to pharmacy for #90 day supply  on 10/9 if that helps.       QUEtiapine (SEROquel) 25 mg tablet 25 mg, Oral, Daily at bedtime              Summary: take 1 tablet by mouth at bedtime, Starting Wed 10/9/2024, Normal  Start: 10/09/2024Ord/Sold: 10/09/2024 (O)Ordered On: 10/09/2024Pharmacy: [id number] - [place], [place] [address]: Long-term: Med Note:            Patient Sig: take 1 tablet by mouth at bedtime  Ordering Location: [facility]  Ordering Department: [facility]  Authorized By: Keya Chan DO  Dispense: 90 tablet  Refills: 0 ordered  Prior Authorization:   Request [place]  Dose History

## 2024-11-20 DIAGNOSIS — Z79.4 TYPE 2 DIABETES MELLITUS WITH HYPERGLYCEMIA, WITH LONG-TERM CURRENT USE OF INSULIN (HCC): ICD-10-CM

## 2024-11-20 DIAGNOSIS — E11.9 TYPE 2 DIABETES MELLITUS WITHOUT COMPLICATION, WITHOUT LONG-TERM CURRENT USE OF INSULIN (HCC): ICD-10-CM

## 2024-11-20 DIAGNOSIS — E11.65 TYPE 2 DIABETES MELLITUS WITH HYPERGLYCEMIA, WITH LONG-TERM CURRENT USE OF INSULIN (HCC): ICD-10-CM

## 2024-11-20 RX ORDER — BLOOD-GLUCOSE METER
EACH MISCELLANEOUS
Refills: 0 | Status: CANCELLED | OUTPATIENT
Start: 2024-11-20

## 2024-11-20 RX ORDER — ACYCLOVIR 400 MG/1
1 TABLET ORAL DAILY
Qty: 9 EACH | Refills: 0 | Status: SHIPPED | OUTPATIENT
Start: 2024-11-20

## 2024-11-20 RX ORDER — ACYCLOVIR 400 MG/1
1 TABLET ORAL DAILY
Qty: 1 EACH | Refills: 0 | Status: SHIPPED | OUTPATIENT
Start: 2024-11-20

## 2024-11-20 RX ORDER — INSULIN LISPRO 100 [IU]/ML
45 INJECTION, SUSPENSION SUBCUTANEOUS 2 TIMES DAILY WITH MEALS
Qty: 15 ML | Refills: 0 | Status: SHIPPED | OUTPATIENT
Start: 2024-11-20

## 2024-11-20 NOTE — TELEPHONE ENCOUNTER
Looks like she has rx for dexcom G7 sensor and . If she is monitoring sugars this way, should not need accucheck.? Did not refill accucheck.

## 2024-11-21 ENCOUNTER — TELEPHONE (OUTPATIENT)
Age: 64
End: 2024-11-21

## 2024-11-21 NOTE — TELEPHONE ENCOUNTER
PA for Dexcom G7   NOT REQUIRED     Reason (screenshot if applicable)          Patient advised by          [] MyChart Message  [] Phone call   []LMOM  []L/M to call office as no active Communication consent on file  []PT LAST PICKED UP RECIEVER 07/03/24       Pharmacy advised by  SPOKE WITH PHARMACY AND HER INSURANCE COVERS 1  EVERY 274 DAYS AND THE NEXT FILL WILL BE 04/03/25. PT LAST RECEIVED  ON 07/03/24    []Fax  [x]Phone call

## 2024-12-31 ENCOUNTER — TELEPHONE (OUTPATIENT)
Dept: FAMILY MEDICINE CLINIC | Facility: CLINIC | Age: 64
End: 2024-12-31

## 2024-12-31 NOTE — TELEPHONE ENCOUNTER
----- Message from Kay BUTCHER sent at 12/31/2024 10:09 AM EST -----  Please schedule Medicare Wellness and diabetic check

## 2025-01-01 DIAGNOSIS — E11.9 TYPE 2 DIABETES MELLITUS WITHOUT COMPLICATION, WITHOUT LONG-TERM CURRENT USE OF INSULIN (HCC): ICD-10-CM

## 2025-01-01 DIAGNOSIS — G47.00 INSOMNIA, UNSPECIFIED TYPE: ICD-10-CM

## 2025-01-01 DIAGNOSIS — E11.65 TYPE 2 DIABETES MELLITUS WITH HYPERGLYCEMIA, WITH LONG-TERM CURRENT USE OF INSULIN (HCC): ICD-10-CM

## 2025-01-01 DIAGNOSIS — Z79.4 TYPE 2 DIABETES MELLITUS WITH HYPERGLYCEMIA, WITH LONG-TERM CURRENT USE OF INSULIN (HCC): ICD-10-CM

## 2025-01-02 RX ORDER — QUETIAPINE FUMARATE 25 MG/1
25 TABLET, FILM COATED ORAL
Qty: 30 TABLET | Refills: 0 | Status: SHIPPED | OUTPATIENT
Start: 2025-01-02

## 2025-01-02 RX ORDER — INSULIN LISPRO 100 [IU]/ML
45 INJECTION, SUSPENSION SUBCUTANEOUS 2 TIMES DAILY WITH MEALS
Qty: 30 ML | Refills: 5 | Status: SHIPPED | OUTPATIENT
Start: 2025-01-02

## 2025-01-02 RX ORDER — PEN NEEDLE, DIABETIC 32GX 5/32"
NEEDLE, DISPOSABLE MISCELLANEOUS 2 TIMES DAILY
Qty: 200 EACH | Refills: 1 | Status: SHIPPED | OUTPATIENT
Start: 2025-01-02

## 2025-01-02 NOTE — TELEPHONE ENCOUNTER
Last seen in July. Due for appt and nothing scheduled.   I will give her #30 (no refills) to last until she is seen

## 2025-02-19 ENCOUNTER — OFFICE VISIT (OUTPATIENT)
Dept: FAMILY MEDICINE CLINIC | Facility: CLINIC | Age: 65
End: 2025-02-19
Payer: MEDICARE

## 2025-02-19 VITALS
HEIGHT: 68 IN | HEART RATE: 108 BPM | BODY MASS INDEX: 30.89 KG/M2 | WEIGHT: 203.8 LBS | DIASTOLIC BLOOD PRESSURE: 70 MMHG | SYSTOLIC BLOOD PRESSURE: 120 MMHG | OXYGEN SATURATION: 97 %

## 2025-02-19 DIAGNOSIS — H69.92 DYSFUNCTION OF LEFT EUSTACHIAN TUBE: ICD-10-CM

## 2025-02-19 DIAGNOSIS — E11.65 TYPE 2 DIABETES MELLITUS WITH HYPERGLYCEMIA, WITH LONG-TERM CURRENT USE OF INSULIN (HCC): ICD-10-CM

## 2025-02-19 DIAGNOSIS — I10 PRIMARY HYPERTENSION: ICD-10-CM

## 2025-02-19 DIAGNOSIS — Z12.11 COLON CANCER SCREENING: ICD-10-CM

## 2025-02-19 DIAGNOSIS — Z23 ENCOUNTER FOR IMMUNIZATION: ICD-10-CM

## 2025-02-19 DIAGNOSIS — K58.2 IRRITABLE BOWEL SYNDROME WITH BOTH CONSTIPATION AND DIARRHEA: ICD-10-CM

## 2025-02-19 DIAGNOSIS — E78.2 MIXED HYPERLIPIDEMIA: ICD-10-CM

## 2025-02-19 DIAGNOSIS — F41.9 ANXIETY: ICD-10-CM

## 2025-02-19 DIAGNOSIS — Z00.00 MEDICARE ANNUAL WELLNESS VISIT, INITIAL: Primary | ICD-10-CM

## 2025-02-19 DIAGNOSIS — G47.00 INSOMNIA, UNSPECIFIED TYPE: ICD-10-CM

## 2025-02-19 DIAGNOSIS — Z79.4 TYPE 2 DIABETES MELLITUS WITH HYPERGLYCEMIA, WITH LONG-TERM CURRENT USE OF INSULIN (HCC): ICD-10-CM

## 2025-02-19 DIAGNOSIS — R06.02 SHORTNESS OF BREATH: ICD-10-CM

## 2025-02-19 DIAGNOSIS — F32.5 MAJOR DEPRESSIVE DISORDER WITH SINGLE EPISODE, IN FULL REMISSION (HCC): ICD-10-CM

## 2025-02-19 DIAGNOSIS — E11.9 TYPE 2 DIABETES MELLITUS WITHOUT COMPLICATION, WITHOUT LONG-TERM CURRENT USE OF INSULIN (HCC): ICD-10-CM

## 2025-02-19 DIAGNOSIS — Z78.0 POST-MENOPAUSAL: ICD-10-CM

## 2025-02-19 DIAGNOSIS — R53.83 FATIGUE, UNSPECIFIED TYPE: ICD-10-CM

## 2025-02-19 PROBLEM — S06.2XAA: Status: RESOLVED | Noted: 2024-05-20 | Resolved: 2025-02-19

## 2025-02-19 LAB — SL AMB POCT HEMOGLOBIN AIC: 7.9 (ref ?–6.5)

## 2025-02-19 PROCEDURE — 99173 VISUAL ACUITY SCREEN: CPT | Performed by: FAMILY MEDICINE

## 2025-02-19 PROCEDURE — G0008 ADMIN INFLUENZA VIRUS VAC: HCPCS

## 2025-02-19 PROCEDURE — 99214 OFFICE O/P EST MOD 30 MIN: CPT | Performed by: FAMILY MEDICINE

## 2025-02-19 PROCEDURE — G0438 PPPS, INITIAL VISIT: HCPCS | Performed by: FAMILY MEDICINE

## 2025-02-19 PROCEDURE — 92551 PURE TONE HEARING TEST AIR: CPT | Performed by: FAMILY MEDICINE

## 2025-02-19 PROCEDURE — 90662 IIV NO PRSV INCREASED AG IM: CPT

## 2025-02-19 PROCEDURE — 83036 HEMOGLOBIN GLYCOSYLATED A1C: CPT | Performed by: FAMILY MEDICINE

## 2025-02-19 RX ORDER — QUETIAPINE FUMARATE 25 MG/1
25 TABLET, FILM COATED ORAL
Qty: 100 TABLET | Refills: 0 | Status: SHIPPED | OUTPATIENT
Start: 2025-02-19

## 2025-02-19 RX ORDER — INSULIN LISPRO 100 [IU]/ML
INJECTION, SUSPENSION SUBCUTANEOUS
Qty: 30 ML | Refills: 5 | Status: SHIPPED | OUTPATIENT
Start: 2025-02-19

## 2025-02-19 RX ORDER — LISINOPRIL 10 MG/1
10 TABLET ORAL DAILY
Qty: 90 TABLET | Refills: 0 | Status: SHIPPED | OUTPATIENT
Start: 2025-02-19

## 2025-02-19 RX ORDER — VENLAFAXINE HYDROCHLORIDE 150 MG/1
300 CAPSULE, EXTENDED RELEASE ORAL DAILY
Qty: 180 CAPSULE | Refills: 0 | Status: SHIPPED | OUTPATIENT
Start: 2025-02-19

## 2025-02-19 RX ORDER — ATORVASTATIN CALCIUM 10 MG/1
10 TABLET, FILM COATED ORAL DAILY
Qty: 100 TABLET | Refills: 0 | Status: SHIPPED | OUTPATIENT
Start: 2025-02-19

## 2025-02-19 RX ORDER — FAMOTIDINE 20 MG/1
20 TABLET, FILM COATED ORAL DAILY
Qty: 90 TABLET | Refills: 0 | Status: SHIPPED | OUTPATIENT
Start: 2025-02-19

## 2025-02-19 NOTE — ASSESSMENT & PLAN NOTE
She was previously following with endocrinology but has not been seen in quite some time and canceled her appt in January.   A1c done in office today was 7.9  Will collect urine for microalbumin as well.   Lab Results   Component Value Date    HGBA1C 7.9 (A) 02/19/2025     We discussed starting GLP1RA for both diabetes and weight loss. She is agreeable.   Discussed potential side effects   Patient denies personal and family history of MCT and MEN2 tumors. Patient denies personal history of pancreatitis   Rx for ozempic sent  Foot exam done in office today    Orders:    POCT hemoglobin A1c    Albumin / creatinine urine ratio    Comprehensive metabolic panel    semaglutide, 0.25 or 0.5 mg/dose, (Ozempic, 0.25 or 0.5 MG/DOSE,) 2 mg/3 mL injection pen; 0.25 mg under the skin every 7 days for 4 doses (28 days), THEN 0.5 mg under the skin every 7 days

## 2025-02-19 NOTE — ASSESSMENT & PLAN NOTE
On atorvastatin 10 mg  Rx refilled today  She needs lipid panel done. This was ordered  Orders:    Lipid panel    atorvastatin (LIPITOR) 10 mg tablet; Take 1 tablet (10 mg total) by mouth daily

## 2025-02-19 NOTE — PROGRESS NOTES
Patient is a 63 year old female with hypertension, hyperlipidemia, DM2, depression, anxiety and history of breast cancer s/p bilateral mastectomy [facility].   Colon cancer screening is overdue. Had cologuard done on 9/24/18.   Has not had DEXA scan.     She has not been seen here since July of 2024 at which time she was seen  for a hospital f/u visit from lengthy stay at [facility] where she sustained a head injury. Was following with neurosurgery at [facility]    At time of her last OV here, she was reportedly seeing endocrinology for her diabetes.   In review of chart, does not look like she has seen them since her last visit with me (canceled January's appt due to lack of transportation and did not reschedule)  Lab Results   Component Value Date    HGBA1C 8.5 (H) 05/16/2024     Has not had labs done in quite some time.     Has many additional concerns today as well    Some fullness in left ear off and on.   Had covid *** and cough persists.   She has decreased energy. When she walks or stands for prolonged time periods, ***

## 2025-02-19 NOTE — PROGRESS NOTES
Name: Lynette Leyva      : 1960      MRN: [id number]  Encounter Provider: Keya Chan DO  Encounter Date: 2025   Encounter department: [facility] PRIMARY [facility]ARE    Assessment & Plan  Medicare annual wellness visit, initial         Type 2 diabetes mellitus with hyperglycemia, with long-term current use of insulin (HCC)  She was previously following with endocrinology but has not been seen in quite some time and canceled her appt in January.   A1c done in office today was 7.9  Will collect urine for microalbumin as well.   Lab Results   Component Value Date    HGBA1C 7.9 (A) 2025     We discussed starting GLP1RA for both diabetes and weight loss. She is agreeable.   Discussed potential side effects   Patient denies personal and family history of MCT and MEN2 tumors. Patient denies personal history of pancreatitis   Rx for ozempic sent  Foot exam done in office today    Orders:  •  POCT hemoglobin A1c  •  Albumin / creatinine urine ratio  •  Comprehensive metabolic panel  •  semaglutide, 0.25 or 0.5 mg/dose, (Ozempic, 0.25 or 0.5 MG/DOSE,) 2 mg/3 mL injection pen; 0.25 mg under the skin every 7 days for 4 doses (28 days), THEN 0.5 mg under the skin every 7 days    Primary hypertension  BP is at goal on her current medication  Continue lisinopril 10 mg  Check cmp    Orders:  •  Comprehensive metabolic panel    Mixed hyperlipidemia  On atorvastatin 10 mg  Rx refilled today  She needs lipid panel done. This was ordered  Orders:  •  Lipid panel  •  atorvastatin (LIPITOR) 10 mg tablet; Take 1 tablet (10 mg total) by mouth daily    Major depressive disorder with single episode, in full remission (HCC)  Stable on effexor.   Rx refilled.         Orders:  •  venlafaxine (EFFEXOR-XR) 150 mg 24 hr capsule; Take 2 capsules (300 mg total) by mouth daily    Anxiety    Orders:  •  venlafaxine (EFFEXOR-XR) 150 mg 24 hr capsule; Take 2 capsules (300 mg total) by mouth daily    Insomnia,  unspecified type  On seroquel for this which works well but she is frustrated that it has caused her to gain some weight.   Orders:  •  QUEtiapine (SEROquel) 25 mg tablet; Take 1 tablet (25 mg total) by mouth daily at bedtime    Type 2 diabetes mellitus without complication, without long-term current use of insulin ([facility])    Lab Results   Component Value Date    HGBA1C 7.9 (A) 02/19/2025       Orders:  •  lisinopril (ZESTRIL) 10 mg tablet; Take 1 tablet (10 mg total) by mouth daily  •  HumaLOG Mix 75/25 KwikPen (75-25) 100 units/mL injection pen; Inject 45 Units under the skin every morning before breakfast AND 22 Units every day before dinner. e11.65 (Type 2 Diabetes).    Irritable bowel syndrome with both constipation and diarrhea    Orders:  •  famotidine (PEPCID) 20 mg tablet; Take 1 tablet (20 mg total) by mouth daily    Shortness of breath  Since havign covid infection in December.   Check CXR and echo  Also check labs (cbc and tsh)  Orders:  •  Echo complete w/ contrast if indicated; Future  •  XR chest pa and lateral; Future    Colon cancer screening  Due for colon cancer screening. Previously had cologuard and she wants to have this again  Orders:  •  Cologuard    Fatigue, unspecified type    Orders:  •  CBC and differential  •  TSH, 3rd generation with Free T4 reflex    BMI 31.0-31.9,adult    Orders:  •  semaglutide, 0.25 or 0.5 mg/dose, (Ozempic, 0.25 or 0.5 MG/DOSE,) 2 mg/3 mL injection pen; 0.25 mg under the skin every 7 days for 4 doses (28 days), THEN 0.5 mg under the skin every 7 days    Post-menopausal  Due for dexa  Orders:  •  DXA bone density spine hip and pelvis; Future    Encounter for immunization  Flu shot ordered  Declines shingrix  Orders:  •  influenza vaccine, high-dose, PF 0.5 mL (Fluzone High Dose)    Dysfunction of left eustachian tube  Recommend flonase          Preventive health issues were discussed with patient, and age appropriate screening tests were ordered as noted in patient's  "After Visit Summary. Personalized health advice and appropriate referrals for health education or preventive services given if needed, as noted in patient's After Visit Summary.    History of Present Illness     HPI     Patient is a 63 year old female with hypertension, hyperlipidemia, DM2, depression, anxiety and history of breast cancer s/p bilateral mastectomy [facility].   Colon cancer screening is overdue. Had cologuard done on 9/24/18.   Has not had DEXA scan.     She has not been seen here since July of 2024 at which time she was seen  for a hospital f/u visit from lengthy stay at [facility] where she sustained a head injury. Was following with neurosurgery at [facility]    At time of her last OV here, she was reportedly seeing endocrinology for her diabetes.   In review of chart, does not look like she has seen them since her last visit with me (canceled January's appt due to lack of transportation and did not reschedule)  Lab Results   Component Value Date    HGBA1C 8.5 (H) 05/16/2024     Has not had labs done in quite some time.   On humalog 45 and 25.   She does have some hypoglycemia.   Checking sugars at home and readings have been anywhere from .     Has many additional concerns today as well    Some fullness in left ear off and on.   Had covid in December. Seen in ED at [facility].  No records for review. She has a slight cough. Some shortness of breath if she is \"doing a lot\" or doing stairs. This started since covid.   She has decreased energy.   When she walks or stands for prolonged time periods, gets very tired. This has been going on even prior to the covid infection.   Doing PT for her balance an she reports that this is much better.     On seroquel 25 mg as mood stabilizer and effexor  mg 2 daily for her depression.   Requesting refill today      Patient Care Team:  Keya Chan, DO as PCP - General (Family Medicine)  Keya Chan, DO as PCP - " PCP-[facility] ([facility])    Review of Systems  Medical History Reviewed by provider this encounter:  Allergies  Meds       Annual Wellness Visit Questionnaire   Lynette is here for her Welcome to Medicare visit.     Health Risk Assessment:   Patient rates overall health as good. Patient feels that their physical health rating is slightly worse. Patient is very satisfied with their life. Eyesight was rated as same. Hearing was rated as same. Patient feels that their emotional and mental health rating is same. Patients states they are sometimes angry. Patient states they are always unusually tired/fatigued. Pain experienced in the last 7 days has been none. Patient states that she has experienced weight loss or gain in last 6 months. 169 lbs at last visit in July.    Fall Risk Screening:   In the past year, patient has experienced: history of falling in past year    Number of falls: 1  Injured during fall?: Yes    Feels unsteady when standing or walking?: Yes    Worried about falling?: Yes      Urinary Incontinence Screening:   Patient has leaked urine accidently in the last six months.     Home Safety:  Patient does not have trouble with stairs inside or outside of their home. Patient has working smoke alarms and has no working carbon monoxide detector. Home safety hazards include: none.     Nutrition:   Current diet is Diabetic and Low Carb.     Medications:   Patient is not currently taking any over-the-counter supplements. Patient is able to manage medications.     Activities of Daily Living (ADLs)/Instrumental Activities of Daily Living (IADLs):   Walk and transfer into and out of bed and chair?: Yes  Dress and groom yourself?: Yes    Bathe or shower yourself?: Yes    Feed yourself? Yes  Do your laundry/housekeeping?: Yes  Manage your money, pay your bills and track your expenses?: Yes  Make your own meals?: Yes    Do your own shopping?: Yes    Previous Hospitalizations:   Any hospitalizations or ED visits  within the last 12 months?: Yes    How many hospitalizations have you had in the last year?: 1-2    Advance Care Planning:   Living will: No    Durable POA for healthcare: No    Advanced directive: No    Advanced directive counseling given: Yes      Cognitive Screening:   Provider or family/friend/caregiver concerned regarding cognition?: Yes    Cognition Comments: Better now than it was  year ago  History of TBI    PREVENTIVE SCREENINGS      Cardiovascular Screening:    General: Risks and Benefits Discussed    Due for: Lipid Panel      Diabetes Screening:     General: Risks and Benefits Discussed      Colorectal Cancer Screening:     General: Risks and Benefits Discussed    Due for: Cologuard      Breast Cancer Screening:     General: Screening Not Indicated and History Breast Cancer      Cervical Cancer Screening:    General: Screening Not Indicated      Osteoporosis Screening:    General: Risks and Benefits Discussed    Due for: DXA Appendicular      Lung Cancer Screening:     General: Screening Not Indicated      Hepatitis C Screening:    General: Screening Current    Screening, Brief Intervention, and Referral to Treatment (SBIRT)     Screening  Typical number of drinks in a day: 0  Typical number of drinks in a week: 0  Interpretation: Low risk drinking behavior.    AUDIT-C Screenin) How often did you have a drink containing alcohol in the past year? never  2) How many drinks did you have on a typical day when you were drinking in the past year? 0  3) How often did you have 6 or more drinks on one occasion in the past year? never    AUDIT-C Score: 0  Interpretation: Score 0-2 (female): Negative screen for alcohol misuse    Single Item Drug Screening:  How often have you used an illegal drug (including marijuana) or a prescription medication for non-medical reasons in the past year? never    Single Item Drug Screen Score: 0  Interpretation: Negative screen for possible drug use disorder    Brief  Intervention  Alcohol & drug use screenings were reviewed. No concerns regarding substance use disorder identified.   Diabetic Foot Exam    Patient's shoes and socks removed.    Right Foot/Ankle   Right Foot Inspection  Skin Exam: skin normal and skin intact. No dry skin, no warmth, no callus, no erythema, no maceration, no abnormal color, no pre-ulcer, no ulcer and no callus.     Toe Exam: ROM and strength within normal limits.     Sensory   Vibration: intact  Proprioception: intact  Monofilament testing: intact    Vascular  Capillary refills: < 3 seconds  The right DP pulse is 2+. The right PT pulse is 2+.     Left Foot/Ankle  Left Foot Inspection  Skin Exam: skin normal and skin intact. No dry skin, no warmth, no erythema, no maceration, normal color, no pre-ulcer, no ulcer and no callus.     Toe Exam: ROM and strength within normal limits.     Sensory   Vibration: intact  Proprioception: intact  Monofilament testing: intact    Vascular  Capillary refills: < 3 seconds  The left DP pulse is 2+. The left PT pulse is 2+.     Assign Risk Category  No deformity present  No loss of protective sensation  No weak pulses  Risk: 0    Social Drivers of Health     Financial Resource Strain: Low Risk  (5/20/2024)    Received from [facility]    Overall Financial Resource Strain (CARDIA)    • Difficulty of Paying Living Expenses: Not hard at all   Food Insecurity: No Food Insecurity (2/19/2025)    Hunger Vital Sign    • Worried About Running Out of Food in the Last Year: Never true    • Ran Out of Food in the Last Year: Never true   Transportation Needs: No Transportation Needs (2/19/2025)    PRAPARE - Transportation    • Lack of Transportation (Medical): No    • Lack of Transportation (Non-Medical): No   Housing Stability: Low Risk  (2/19/2025)    Housing Stability Vital Sign    • Unable to Pay for Housing in the Last Year: No    • Number of Times Moved in the Last Year: 0    • Homeless in the Last Year: No  "  Utilities: Not At Risk (2/19/2025)    [facility] Utilities    • Threatened with loss of utilities: No     Hearing Screening    125Hz 250Hz 500Hz 1000Hz 2000Hz 3000Hz 4000Hz 5000Hz 6000Hz 8000Hz   Right ear 25 25 30 30 30 40 40 40 40 55   Left ear 25 25 25 30 30 35 35 40 40 50     Vision Screening    Right eye Left eye Both eyes   Without correction      With correction 20/13 20/13 20/13       Objective   /70   Pulse (!) 108   Ht 5' 7.5\" (1.715 m)   Wt 92.4 kg (203 lb 12.8 oz)   SpO2 97%   BMI 31.45 kg/m²     Physical Exam  Vitals and nursing note reviewed.   Constitutional:       General: She is not in acute distress.     Appearance: Normal appearance. She is not ill-appearing, toxic-appearing or diaphoretic.      Comments: Ambulates without assistance   HENT:      Head: Normocephalic and atraumatic.      Right Ear: Tympanic membrane normal. There is no impacted cerumen.      Left Ear: Tympanic membrane normal. There is no impacted cerumen.      Nose: Nose normal. No congestion or rhinorrhea.      Mouth/Throat:      Mouth: Mucous membranes are moist.      Pharynx: No posterior oropharyngeal erythema.   Eyes:      Conjunctiva/sclera: Conjunctivae normal.   Neck:      Vascular: No carotid bruit.   Cardiovascular:      Rate and Rhythm: Normal rate and regular rhythm.      Pulses: no weak pulses.           Dorsalis pedis pulses are 2+ on the right side and 2+ on the left side.        Posterior tibial pulses are 2+ on the right side and 2+ on the left side.      Heart sounds: No murmur heard.  Pulmonary:      Effort: Pulmonary effort is normal.      Breath sounds: Normal breath sounds.   Abdominal:      General: Abdomen is flat. Bowel sounds are normal.      Palpations: Abdomen is soft.   Musculoskeletal:      Cervical back: Normal range of motion and neck supple.      Right lower leg: No edema.      Left lower leg: No edema.   Feet:      Right foot:      Skin integrity: No ulcer, skin breakdown, erythema, warmth, " callus or dry skin.      Left foot:      Skin integrity: No ulcer, skin breakdown, erythema, warmth, callus or dry skin.   Lymphadenopathy:      Cervical: No cervical adenopathy.   Skin:     General: Skin is warm and dry.      Findings: No rash.   Neurological:      General: No focal deficit present.      Mental Status: She is alert.

## 2025-02-19 NOTE — ASSESSMENT & PLAN NOTE
BP is at goal on her current medication  Continue lisinopril 10 mg  Check cmp    Orders:    Comprehensive metabolic panel

## 2025-02-19 NOTE — ASSESSMENT & PLAN NOTE
Stable on effexor.   Rx refilled.         Orders:    venlafaxine (EFFEXOR-XR) 150 mg 24 hr capsule; Take 2 capsules (300 mg total) by mouth daily

## 2025-02-19 NOTE — PATIENT INSTRUCTIONS
Medicare Preventive Visit Patient Instructions  Thank you for completing your Welcome to Medicare Visit or Medicare Annual Wellness Visit today. Your next wellness visit will be due in one year (2/20/2026).  The screening/preventive services that you may require over the next 5-10 years are detailed below. Some tests may not apply to you based off risk factors and/or age. Screening tests ordered at today's visit but not completed yet may show as past due. Also, please note that scanned in results may not display below.  Preventive Screenings:  Service Recommendations Previous Testing/Comments   Colorectal Cancer Screening  * Colonoscopy    * Fecal Occult Blood Test (FOBT)/Fecal Immunochemical Test (FIT)  * Fecal DNA/Cologuard Test  * Flexible Sigmoidoscopy Age: 45-75 years old   Colonoscopy: every 10 years (may be performed more frequently if at higher risk)  OR  FOBT/FIT: every 1 year  OR  Cologuard: every 3 years  OR  Sigmoidoscopy: every 5 years  Screening may be recommended earlier than age 45 if at higher risk for colorectal cancer. Also, an individualized decision between you and your healthcare provider will decide whether screening between the ages of 76-85 would be appropriate. Colonoscopy: Not on file  FOBT/FIT: Not on file  Cologuard: Not on file  Sigmoidoscopy: Not on file          Breast Cancer Screening Age: 40+ years old  Frequency: every 1-2 years  Not required if history of left and right mastectomy Mammogram: Not on file        Cervical Cancer Screening Between the ages of 21-29, pap smear recommended once every 3 years.   Between the ages of 30-65, can perform pap smear with HPV co-testing every 5 years.   Recommendations may differ for women with a history of total hysterectomy, cervical cancer, or abnormal pap smears in past. Pap Smear: 06/17/2021        Hepatitis C Screening Once for adults born between 1945 and 1965  More frequently in patients at high risk for Hepatitis C Hep C Antibody:  11/06/2021        Diabetes Screening 1-2 times per year if you're at risk for diabetes or have pre-diabetes Fasting glucose: 173 mg/dL (10/27/2023)  A1C: 8.5 % (5/16/2024)      Cholesterol Screening Once every 5 years if you don't have a lipid disorder. May order more often based on risk factors. Lipid panel: 10/27/2023          Other Preventive Screenings Covered by Medicare:  Abdominal Aortic Aneurysm (AAA) Screening: covered once if your at risk. You're considered to be at risk if you have a family history of AAA.  Lung Cancer Screening: covers low dose CT scan once per year if you meet all of the following conditions: (1) Age 55-77; (2) No signs or symptoms of lung cancer; (3) Current smoker or have quit smoking within the last 15 years; (4) You have a tobacco smoking history of at least 20 pack years (packs per day multiplied by number of years you smoked); (5) You get a written order from a healthcare provider.  Glaucoma Screening: covered annually if you're considered high risk: (1) You have diabetes OR (2) Family history of glaucoma OR (3)  aged 50 and older OR (4)  American aged 65 and older  Osteoporosis Screening: covered every 2 years if you meet one of the following conditions: (1) You're estrogen deficient and at risk for osteoporosis based off medical history and other findings; (2) Have a vertebral abnormality; (3) On glucocorticoid therapy for more than 3 months; (4) Have primary hyperparathyroidism; (5) On osteoporosis medications and need to assess response to drug therapy.   Last bone density test (DXA Scan): Not on file.  HIV Screening: covered annually if you're between the age of 15-65. Also covered annually if you are younger than 15 and older than 65 with risk factors for HIV infection. For pregnant patients, it is covered up to 3 times per pregnancy.    Immunizations:  Immunization Recommendations   Influenza Vaccine Annual influenza vaccination during flu season is  recommended for all persons aged >= 6 months who do not have contraindications   Pneumococcal Vaccine   * Pneumococcal conjugate vaccine = PCV13 (Prevnar 13), PCV15 (Vaxneuvance), PCV20 (Prevnar 20)  * Pneumococcal polysaccharide vaccine = PPSV23 (Pneumovax) Adults 19-65 yo with certain risk factors or if 65+ yo  If never received any pneumonia vaccine: recommend Prevnar 20 (PCV20)  Give PCV20 if previously received 1 dose of PCV13 or PPSV23   Hepatitis B Vaccine 3 dose series if at intermediate or high risk (ex: diabetes, end stage renal disease, liver disease)   Respiratory syncytial virus (RSV) Vaccine - COVERED BY MEDICARE PART D  * RSVPreF3 (Arexvy) CDC recommends that adults 60 years of age and older may receive a single dose of RSV vaccine using shared clinical decision-making (SCDM)   Tetanus (Td) Vaccine - COST NOT COVERED BY MEDICARE PART B Following completion of primary series, a booster dose should be given every 10 years to maintain immunity against tetanus. Td may also be given as tetanus wound prophylaxis.   Tdap Vaccine - COST NOT COVERED BY MEDICARE PART B Recommended at least once for all adults. For pregnant patients, recommended with each pregnancy.   Shingles Vaccine (Shingrix) - COST NOT COVERED BY MEDICARE PART B  2 shot series recommended in those 19 years and older who have or will have weakened immune systems or those 50 years and older     Health Maintenance Due:      Topic Date Due   • Colorectal Cancer Screening  09/24/2021   • Hepatitis C Screening  Completed     Immunizations Due:      Topic Date Due   • Influenza Vaccine (1) 09/01/2024   • COVID-19 Vaccine (4 - 2024-25 season) 09/01/2024     Advance Directives   What are advance directives?  Advance directives are legal documents that state your wishes and plans for medical care. These plans are made ahead of time in case you lose your ability to make decisions for yourself. Advance directives can apply to any medical decision, such  as the treatments you want, and if you want to donate organs.   What are the types of advance directives?  There are many types of advance directives, and each state has rules about how to use them. You may choose a combination of any of the following:  Living will:  This is a written record of the treatment you want. You can also choose which treatments you do not want, which to limit, and which to stop at a certain time. This includes surgery, medicine, IV fluid, and tube feedings.   Durable power of  for healthcare (DPAHC):  This is a written record that states who you want to make healthcare choices for you when you are unable to make them for yourself. This person, called a proxy, is usually a family member or a friend. You may choose more than 1 proxy.  Do not resuscitate (DNR) order:  A DNR order is used in case your heart stops beating or you stop breathing. It is a request not to have certain forms of treatment, such as CPR. A DNR order may be included in other types of advance directives.  Medical directive:  This covers the care that you want if you are in a coma, near death, or unable to make decisions for yourself. You can list the treatments you want for each condition. Treatment may include pain medicine, surgery, blood transfusions, dialysis, IV or tube feedings, and a ventilator (breathing machine).  Values history:  This document has questions about your views, beliefs, and how you feel and think about life. This information can help others choose the care that you would choose.  Why are advance directives important?  An advance directive helps you control your care. Although spoken wishes may be used, it is better to have your wishes written down. Spoken wishes can be misunderstood, or not followed. Treatments may be given even if you do not want them. An advance directive may make it easier for your family to make difficult choices about your care.   Weight Management   Why it is  important to manage your weight:  Being overweight increases your risk of health conditions such as heart disease, high blood pressure, type 2 diabetes, and certain types of cancer. It can also increase your risk for osteoarthritis, sleep apnea, and other respiratory problems. Aim for a slow, steady weight loss. Even a small amount of weight loss can lower your risk of health problems.  How to lose weight safely:  A safe and healthy way to lose weight is to eat fewer calories and get regular exercise. You can lose up about 1 pound a week by decreasing the number of calories you eat by 500 calories each day.   Healthy meal plan for weight management:  A healthy meal plan includes a variety of foods, contains fewer calories, and helps you stay healthy. A healthy meal plan includes the following:  Eat whole-grain foods more often.  A healthy meal plan should contain fiber. Fiber is the part of grains, fruits, and vegetables that is not broken down by your body. Whole-grain foods are healthy and provide extra fiber in your diet. Some examples of whole-grain foods are whole-wheat breads and pastas, oatmeal, brown rice, and bulgur.  Eat a variety of vegetables every day.  Include dark, leafy greens such as spinach, kale, adriano greens, and mustard greens. Eat yellow and orange vegetables such as carrots, sweet potatoes, and winter squash.   Eat a variety of fruits every day.  Choose fresh or canned fruit (canned in its own juice or light syrup) instead of juice. Fruit juice has very little or no fiber.  Eat low-fat dairy foods.  Drink fat-free (skim) milk or 1% milk. Eat fat-free yogurt and low-fat cottage cheese. Try low-fat cheeses such as mozzarella and other reduced-fat cheeses.  Choose meat and other protein foods that are low in fat.  Choose beans or other legumes such as split peas or lentils. Choose fish, skinless poultry (chicken or turkey), or lean cuts of red meat (beef or pork). Before you cook meat or  poultry, cut off any visible fat.   Use less fat and oil.  Try baking foods instead of frying them. Add less fat, such as margarine, sour cream, regular salad dressing and mayonnaise to foods. Eat fewer high-fat foods. Some examples of high-fat foods include french fries, doughnuts, ice cream, and cakes.  Eat fewer sweets.  Limit foods and drinks that are high in sugar. This includes candy, cookies, regular soda, and sweetened drinks.  Exercise:  Exercise at least 30 minutes per day on most days of the week. Some examples of exercise include walking, biking, dancing, and swimming. You can also fit in more physical activity by taking the stairs instead of the elevator or parking farther away from stores. Ask your healthcare provider about the best exercise plan for you.      © Copyright [employer] 2018 Information is for End User's use only and may not be sold, redistributed or otherwise used for commercial purposes. All illustrations and images included in CareNotes® are the copyrighted property of [employer] or [employer]

## 2025-02-21 ENCOUNTER — TELEPHONE (OUTPATIENT)
Dept: FAMILY MEDICINE CLINIC | Facility: CLINIC | Age: 65
End: 2025-02-21

## 2025-02-21 DIAGNOSIS — Z79.4 TYPE 2 DIABETES MELLITUS WITH HYPERGLYCEMIA, WITH LONG-TERM CURRENT USE OF INSULIN (HCC): Primary | ICD-10-CM

## 2025-02-21 DIAGNOSIS — E11.65 TYPE 2 DIABETES MELLITUS WITH HYPERGLYCEMIA, WITH LONG-TERM CURRENT USE OF INSULIN (HCC): Primary | ICD-10-CM

## 2025-02-21 NOTE — TELEPHONE ENCOUNTER
Pt was unable to complete specimen collection in office and was supposed to bring urine back yesterday. Called this morning reminding pt we need collection.

## 2025-04-01 ENCOUNTER — TELEPHONE (OUTPATIENT)
Dept: FAMILY MEDICINE CLINIC | Facility: CLINIC | Age: 65
End: 2025-04-01

## 2025-04-01 NOTE — TELEPHONE ENCOUNTER
Left message for patient to call back to re-schedule missed appointment for today.    DM follow up as well as weight check for GLP1.    Provided cb#.

## 2025-04-28 ENCOUNTER — VBI (OUTPATIENT)
Dept: ADMINISTRATIVE | Facility: OTHER | Age: 65
End: 2025-04-28

## 2025-04-28 NOTE — TELEPHONE ENCOUNTER
04/28/25 11:02 AM     Chart reviewed for CRC: Colonoscopy ; nothing is submitted to the patient's insurance at this time.     Elida Amezquita MA   [facility]

## 2025-05-02 ENCOUNTER — HOSPITAL ENCOUNTER (OUTPATIENT)
Dept: NON INVASIVE DIAGNOSTICS | Age: 65
Discharge: HOME | End: 2025-05-02
Payer: COMMERCIAL

## 2025-05-02 VITALS
BODY MASS INDEX: 31.86 KG/M2 | DIASTOLIC BLOOD PRESSURE: 88 MMHG | HEART RATE: 98 BPM | SYSTOLIC BLOOD PRESSURE: 148 MMHG | HEIGHT: 67 IN | WEIGHT: 203 LBS

## 2025-05-02 DIAGNOSIS — R06.02 SHORTNESS OF BREATH: ICD-10-CM

## 2025-05-02 LAB
AORTIC ROOT: 2.9 CM
ASCENDING AORTA: 3.1 CM
BSA FOR ECHO PROCEDURE: 2.03 M2
DOP CALC LVOT AREA: 3.14 CM2
DOP CALC LVOT DIAMETER: 2 CM
E WAVE DECELERATION TIME: 116 MS
E/A RATIO: 0.69
FRACTIONAL SHORTENING: 44 (ref 28–44)
INTERVENTRICULAR SEPTUM IN DIASTOLE (PARASTERNAL SHORT AXIS VIEW): 1.4 CM
INTERVENTRICULAR SEPTUM: 1.4 CM (ref 0.6–1.1)
LAAS-AP2: 12.7 CM2
LAAS-AP4: 10.6 CM2
LEFT ATRIUM SIZE: 3.4 CM
LEFT ATRIUM VOLUME (MOD BIPLANE): 26 ML
LEFT ATRIUM VOLUME INDEX (MOD BIPLANE): 12.7 ML/M2
LEFT INTERNAL DIMENSION IN SYSTOLE: 1.8 CM (ref 2.1–4)
LEFT VENTRICULAR INTERNAL DIMENSION IN DIASTOLE: 3.2 CM (ref 3.5–6)
LEFT VENTRICULAR POSTERIOR WALL IN END DIASTOLE: 1.4 CM
LEFT VENTRICULAR STROKE VOLUME: 31 ML
LV EF US.2D.A4C+ESTIMATED: 54 %
LVSV (TEICH): 31 ML
MV E'TISSUE VEL-SEP: 7 CM/S
MV PEAK A VEL: 0.9 M/S
MV PEAK E VEL: 62 CM/S
MV STENOSIS PRESSURE HALF TIME: 34 MS
MV VALVE AREA P 1/2 METHOD: 6.5
RIGHT ATRIUM AREA SYSTOLE A4C: 10.8 CM2
RIGHT VENTRICLE ID DIMENSION: 3.2 CM
SL CV LEFT ATRIUM LENGTH A2C: 4.7 CM
SL CV PED ECHO LEFT VENTRICLE DIASTOLIC VOLUME (MOD BIPLANE) 2D: 40 ML
SL CV PED ECHO LEFT VENTRICLE SYSTOLIC VOLUME (MOD BIPLANE) 2D: 9 ML
TRICUSPID ANNULAR PLANE SYSTOLIC EXCURSION: 1.7 CM

## 2025-05-02 PROCEDURE — 93306 TTE W/DOPPLER COMPLETE: CPT | Performed by: INTERNAL MEDICINE

## 2025-05-02 PROCEDURE — 93306 TTE W/DOPPLER COMPLETE: CPT

## 2025-05-05 ENCOUNTER — RESULTS FOLLOW-UP (OUTPATIENT)
Dept: FAMILY MEDICINE CLINIC | Facility: CLINIC | Age: 65
End: 2025-05-05

## 2025-05-05 DIAGNOSIS — I51.7 LEFT VENTRICULAR HYPERTROPHY: Primary | ICD-10-CM

## 2025-05-05 LAB
AORTIC ROOT: 2.9 CM
ASCENDING AORTA: 3.1 CM
BSA FOR ECHO PROCEDURE: 2.03 M2
DOP CALC LVOT AREA: 3.14 CM2
DOP CALC LVOT DIAMETER: 2 CM
E WAVE DECELERATION TIME: 116 MS
E/A RATIO: 0.69
FRACTIONAL SHORTENING: 44 (ref 28–44)
INTERVENTRICULAR SEPTUM IN DIASTOLE (PARASTERNAL SHORT AXIS VIEW): 1.4 CM
INTERVENTRICULAR SEPTUM: 1.4 CM (ref 0.6–1.1)
LAAS-AP2: 12.7 CM2
LAAS-AP4: 10.6 CM2
LEFT ATRIUM SIZE: 3.4 CM
LEFT ATRIUM VOLUME (MOD BIPLANE): 26 ML
LEFT ATRIUM VOLUME INDEX (MOD BIPLANE): 12.7 ML/M2
LEFT INTERNAL DIMENSION IN SYSTOLE: 1.8 CM (ref 2.1–4)
LEFT VENTRICULAR INTERNAL DIMENSION IN DIASTOLE: 3.2 CM (ref 3.5–6)
LEFT VENTRICULAR POSTERIOR WALL IN END DIASTOLE: 1.4 CM
LEFT VENTRICULAR STROKE VOLUME: 31 ML
LV EF US.2D.A4C+ESTIMATED: 54 %
LVSV (TEICH): 31 ML
MV E'TISSUE VEL-SEP: 7 CM/S
MV PEAK A VEL: 0.9 M/S
MV PEAK E VEL: 62 CM/S
MV STENOSIS PRESSURE HALF TIME: 34 MS
MV VALVE AREA P 1/2 METHOD: 6.47
RIGHT ATRIUM AREA SYSTOLE A4C: 10.8 CM2
RIGHT VENTRICLE ID DIMENSION: 3.2 CM
SL CV LEFT ATRIUM LENGTH A2C: 4.7 CM
SL CV LV EF: 65
SL CV PED ECHO LEFT VENTRICLE DIASTOLIC VOLUME (MOD BIPLANE) 2D: 40 ML
SL CV PED ECHO LEFT VENTRICLE SYSTOLIC VOLUME (MOD BIPLANE) 2D: 9 ML
TRICUSPID ANNULAR PLANE SYSTOLIC EXCURSION: 1.7 CM

## 2025-05-26 DIAGNOSIS — G47.00 INSOMNIA, UNSPECIFIED TYPE: ICD-10-CM

## 2025-05-26 DIAGNOSIS — F32.5 MAJOR DEPRESSIVE DISORDER WITH SINGLE EPISODE, IN FULL REMISSION (HCC): ICD-10-CM

## 2025-05-26 DIAGNOSIS — E11.65 TYPE 2 DIABETES MELLITUS WITH HYPERGLYCEMIA, WITH LONG-TERM CURRENT USE OF INSULIN (HCC): ICD-10-CM

## 2025-05-26 DIAGNOSIS — Z79.4 TYPE 2 DIABETES MELLITUS WITH HYPERGLYCEMIA, WITH LONG-TERM CURRENT USE OF INSULIN (HCC): ICD-10-CM

## 2025-05-26 DIAGNOSIS — K58.2 IRRITABLE BOWEL SYNDROME WITH BOTH CONSTIPATION AND DIARRHEA: ICD-10-CM

## 2025-05-26 DIAGNOSIS — F41.9 ANXIETY: ICD-10-CM

## 2025-05-26 DIAGNOSIS — E11.9 TYPE 2 DIABETES MELLITUS WITHOUT COMPLICATION, WITHOUT LONG-TERM CURRENT USE OF INSULIN (HCC): ICD-10-CM

## 2025-05-28 RX ORDER — CLOTRIMAZOLE AND BETAMETHASONE DIPROPIONATE 10; .64 MG/G; MG/G
CREAM TOPICAL 2 TIMES DAILY
Qty: 30 G | Refills: 0 | Status: SHIPPED | OUTPATIENT
Start: 2025-05-28

## 2025-05-28 RX ORDER — LISINOPRIL 10 MG/1
10 TABLET ORAL DAILY
Qty: 90 TABLET | Refills: 0 | Status: SHIPPED | OUTPATIENT
Start: 2025-05-28

## 2025-05-28 RX ORDER — QUETIAPINE FUMARATE 25 MG/1
25 TABLET, FILM COATED ORAL
Qty: 100 TABLET | Refills: 0 | Status: SHIPPED | OUTPATIENT
Start: 2025-05-28

## 2025-05-28 RX ORDER — VENLAFAXINE HYDROCHLORIDE 150 MG/1
300 CAPSULE, EXTENDED RELEASE ORAL DAILY
Qty: 180 CAPSULE | Refills: 0 | Status: SHIPPED | OUTPATIENT
Start: 2025-05-28

## 2025-05-28 RX ORDER — FAMOTIDINE 20 MG/1
20 TABLET, FILM COATED ORAL DAILY
Qty: 90 TABLET | Refills: 0 | Status: SHIPPED | OUTPATIENT
Start: 2025-05-28

## 2025-05-28 RX ORDER — ACYCLOVIR 400 MG/1
1 TABLET ORAL DAILY
Qty: 9 EACH | Refills: 0 | Status: SHIPPED | OUTPATIENT
Start: 2025-05-28

## 2025-06-22 DIAGNOSIS — E78.2 MIXED HYPERLIPIDEMIA: ICD-10-CM

## 2025-06-22 RX ORDER — ATORVASTATIN CALCIUM 10 MG/1
10 TABLET, FILM COATED ORAL DAILY
Qty: 30 TABLET | Refills: 0 | Status: SHIPPED | OUTPATIENT
Start: 2025-06-22

## 2025-06-23 NOTE — TELEPHONE ENCOUNTER
Called and left vm for pt notifying of rx refill and outstanding labs. Provided cb number with questions or concerns.

## 2025-07-20 DIAGNOSIS — E78.2 MIXED HYPERLIPIDEMIA: ICD-10-CM

## 2025-07-22 RX ORDER — ATORVASTATIN CALCIUM 10 MG/1
10 TABLET, FILM COATED ORAL DAILY
Qty: 30 TABLET | Refills: 0 | OUTPATIENT
Start: 2025-07-22

## 2025-08-13 ENCOUNTER — PATIENT MESSAGE (OUTPATIENT)
Dept: FAMILY MEDICINE CLINIC | Facility: CLINIC | Age: 65
End: 2025-08-13

## 2025-08-18 ENCOUNTER — RESULTS FOLLOW-UP (OUTPATIENT)
Dept: FAMILY MEDICINE CLINIC | Facility: CLINIC | Age: 65
End: 2025-08-18

## 2025-08-18 ENCOUNTER — APPOINTMENT (OUTPATIENT)
Age: 65
End: 2025-08-18
Attending: FAMILY MEDICINE
Payer: COMMERCIAL

## 2025-08-18 DIAGNOSIS — E11.9 TYPE 2 DIABETES MELLITUS WITHOUT COMPLICATION, WITHOUT LONG-TERM CURRENT USE OF INSULIN (HCC): Primary | ICD-10-CM

## 2025-08-18 LAB
ALBUMIN SERPL BCG-MCNC: 4.4 G/DL (ref 3.5–5)
ALP SERPL-CCNC: 112 U/L (ref 34–104)
ALT SERPL W P-5'-P-CCNC: 22 U/L (ref 7–52)
ANION GAP SERPL CALCULATED.3IONS-SCNC: 7 MMOL/L (ref 4–13)
AST SERPL W P-5'-P-CCNC: 17 U/L (ref 13–39)
BASOPHILS # BLD AUTO: 0.07 THOUSANDS/ÂΜL (ref 0–0.1)
BASOPHILS NFR BLD AUTO: 1 % (ref 0–1)
BILIRUB SERPL-MCNC: 0.47 MG/DL (ref 0.2–1)
BUN SERPL-MCNC: 11 MG/DL (ref 5–25)
CALCIUM SERPL-MCNC: 9.5 MG/DL (ref 8.4–10.2)
CHLORIDE SERPL-SCNC: 104 MMOL/L (ref 96–108)
CHOLEST SERPL-MCNC: 146 MG/DL (ref ?–200)
CO2 SERPL-SCNC: 31 MMOL/L (ref 21–32)
CREAT SERPL-MCNC: 0.88 MG/DL (ref 0.6–1.3)
EOSINOPHIL # BLD AUTO: 0.16 THOUSAND/ÂΜL (ref 0–0.61)
EOSINOPHIL NFR BLD AUTO: 1 % (ref 0–6)
ERYTHROCYTE [DISTWIDTH] IN BLOOD BY AUTOMATED COUNT: 12.7 % (ref 11.6–15.1)
EST. AVERAGE GLUCOSE BLD GHB EST-MCNC: 220 MG/DL
GFR SERPL CREATININE-BSD FRML MDRD: 68 ML/MIN/1.73SQ M
GLUCOSE P FAST SERPL-MCNC: 108 MG/DL (ref 65–99)
HBA1C MFR BLD: 9.3 %
HCT VFR BLD AUTO: 42.9 % (ref 34.8–46.1)
HDLC SERPL-MCNC: 41 MG/DL
HGB BLD-MCNC: 14.4 G/DL (ref 11.5–15.4)
IMM GRANULOCYTES # BLD AUTO: 0.09 THOUSAND/UL (ref 0–0.2)
IMM GRANULOCYTES NFR BLD AUTO: 1 % (ref 0–2)
LDLC SERPL CALC-MCNC: 72 MG/DL (ref 0–100)
LYMPHOCYTES # BLD AUTO: 3.03 THOUSANDS/ÂΜL (ref 0.6–4.47)
LYMPHOCYTES NFR BLD AUTO: 26 % (ref 14–44)
MCH RBC QN AUTO: 30.3 PG (ref 26.8–34.3)
MCHC RBC AUTO-ENTMCNC: 33.6 G/DL (ref 31.4–37.4)
MCV RBC AUTO: 90 FL (ref 82–98)
MONOCYTES # BLD AUTO: 0.74 THOUSAND/ÂΜL (ref 0.17–1.22)
MONOCYTES NFR BLD AUTO: 6 % (ref 4–12)
NEUTROPHILS # BLD AUTO: 7.41 THOUSANDS/ÂΜL (ref 1.85–7.62)
NEUTS SEG NFR BLD AUTO: 65 % (ref 43–75)
NONHDLC SERPL-MCNC: 105 MG/DL
NRBC BLD AUTO-RTO: 0 /100 WBCS
PLATELET # BLD AUTO: 354 THOUSANDS/UL (ref 149–390)
PMV BLD AUTO: 10 FL (ref 8.9–12.7)
POTASSIUM SERPL-SCNC: 3.9 MMOL/L (ref 3.5–5.3)
PROT SERPL-MCNC: 7.3 G/DL (ref 6.4–8.4)
RBC # BLD AUTO: 4.75 MILLION/UL (ref 3.81–5.12)
SODIUM SERPL-SCNC: 142 MMOL/L (ref 135–147)
TRIGL SERPL-MCNC: 166 MG/DL (ref ?–150)
TSH SERPL DL<=0.05 MIU/L-ACNC: 1.61 UIU/ML (ref 0.45–4.5)
WBC # BLD AUTO: 11.5 THOUSAND/UL (ref 4.31–10.16)

## 2025-08-18 PROCEDURE — 83036 HEMOGLOBIN GLYCOSYLATED A1C: CPT | Performed by: FAMILY MEDICINE

## 2025-08-20 PROBLEM — G47.00 INSOMNIA: Status: ACTIVE | Noted: 2025-08-20

## 2025-08-21 ENCOUNTER — OFFICE VISIT (OUTPATIENT)
Dept: FAMILY MEDICINE CLINIC | Facility: CLINIC | Age: 65
End: 2025-08-21
Payer: COMMERCIAL

## 2025-08-21 VITALS
OXYGEN SATURATION: 98 % | DIASTOLIC BLOOD PRESSURE: 70 MMHG | WEIGHT: 193.2 LBS | BODY MASS INDEX: 30.26 KG/M2 | SYSTOLIC BLOOD PRESSURE: 110 MMHG | HEART RATE: 93 BPM

## 2025-08-21 DIAGNOSIS — I10 PRIMARY HYPERTENSION: Primary | ICD-10-CM

## 2025-08-21 DIAGNOSIS — E78.2 MIXED HYPERLIPIDEMIA: ICD-10-CM

## 2025-08-21 DIAGNOSIS — Z79.4 TYPE 2 DIABETES MELLITUS WITH HYPERGLYCEMIA, WITH LONG-TERM CURRENT USE OF INSULIN (HCC): ICD-10-CM

## 2025-08-21 DIAGNOSIS — F41.9 ANXIETY: ICD-10-CM

## 2025-08-21 DIAGNOSIS — E11.65 TYPE 2 DIABETES MELLITUS WITH HYPERGLYCEMIA, WITH LONG-TERM CURRENT USE OF INSULIN (HCC): ICD-10-CM

## 2025-08-21 DIAGNOSIS — F32.5 MAJOR DEPRESSIVE DISORDER WITH SINGLE EPISODE, IN FULL REMISSION (HCC): ICD-10-CM

## 2025-08-21 DIAGNOSIS — D72.829 LEUKOCYTOSIS, UNSPECIFIED TYPE: ICD-10-CM

## 2025-08-21 DIAGNOSIS — G47.00 INSOMNIA, UNSPECIFIED TYPE: ICD-10-CM

## 2025-08-21 DIAGNOSIS — Z23 ENCOUNTER FOR IMMUNIZATION: ICD-10-CM

## 2025-08-21 PROCEDURE — 99214 OFFICE O/P EST MOD 30 MIN: CPT | Performed by: FAMILY MEDICINE

## 2025-08-21 RX ORDER — ZOSTER VACCINE RECOMBINANT, ADJUVANTED 50 MCG/0.5
0.5 KIT INTRAMUSCULAR ONCE
Qty: 1 EACH | Refills: 1 | Status: SHIPPED | OUTPATIENT
Start: 2025-08-21 | End: 2025-08-21

## 2025-08-21 RX ORDER — VENLAFAXINE HYDROCHLORIDE 150 MG/1
300 CAPSULE, EXTENDED RELEASE ORAL DAILY
Qty: 180 CAPSULE | Refills: 1 | Status: SHIPPED | OUTPATIENT
Start: 2025-08-21

## 2025-08-21 RX ORDER — LISINOPRIL 20 MG/1
20 TABLET ORAL DAILY
Status: CANCELLED | OUTPATIENT
Start: 2025-08-21